# Patient Record
Sex: FEMALE | Race: BLACK OR AFRICAN AMERICAN | NOT HISPANIC OR LATINO | URBAN - METROPOLITAN AREA
[De-identification: names, ages, dates, MRNs, and addresses within clinical notes are randomized per-mention and may not be internally consistent; named-entity substitution may affect disease eponyms.]

---

## 2017-01-10 ENCOUNTER — OUTPATIENT (OUTPATIENT)
Dept: OUTPATIENT SERVICES | Facility: HOSPITAL | Age: 57
LOS: 1 days | Discharge: HOME | End: 2017-01-10

## 2017-01-10 DIAGNOSIS — Z98.89 OTHER SPECIFIED POSTPROCEDURAL STATES: Chronic | ICD-10-CM

## 2017-01-10 DIAGNOSIS — C18.9 MALIGNANT NEOPLASM OF COLON, UNSPECIFIED: ICD-10-CM

## 2017-01-11 ENCOUNTER — FORM ENCOUNTER (OUTPATIENT)
Age: 57
End: 2017-01-11

## 2017-01-11 ENCOUNTER — RESULT REVIEW (OUTPATIENT)
Age: 57
End: 2017-01-11

## 2017-01-12 ENCOUNTER — APPOINTMENT (OUTPATIENT)
Dept: HEMATOLOGY ONCOLOGY | Facility: CLINIC | Age: 57
End: 2017-01-12

## 2017-01-12 ENCOUNTER — OUTPATIENT (OUTPATIENT)
Dept: OUTPATIENT SERVICES | Facility: HOSPITAL | Age: 57
LOS: 1 days | End: 2017-01-12
Payer: COMMERCIAL

## 2017-01-12 ENCOUNTER — APPOINTMENT (OUTPATIENT)
Dept: CT IMAGING | Facility: IMAGING CENTER | Age: 57
End: 2017-01-12

## 2017-01-12 ENCOUNTER — APPOINTMENT (OUTPATIENT)
Dept: INFUSION THERAPY | Facility: HOSPITAL | Age: 57
End: 2017-01-12

## 2017-01-12 DIAGNOSIS — C18.9 MALIGNANT NEOPLASM OF COLON, UNSPECIFIED: ICD-10-CM

## 2017-01-12 DIAGNOSIS — Z98.89 OTHER SPECIFIED POSTPROCEDURAL STATES: Chronic | ICD-10-CM

## 2017-01-12 LAB
BASOPHILS # BLD AUTO: 0.1 K/UL — SIGNIFICANT CHANGE UP (ref 0–0.2)
BASOPHILS NFR BLD AUTO: 1.5 % — SIGNIFICANT CHANGE UP (ref 0–2)
EOSINOPHIL # BLD AUTO: 0.2 K/UL — SIGNIFICANT CHANGE UP (ref 0–0.5)
EOSINOPHIL NFR BLD AUTO: 7 % — HIGH (ref 0–6)
HCT VFR BLD CALC: 26.6 % — LOW (ref 34.5–45)
HGB BLD-MCNC: 8.4 G/DL — LOW (ref 11.5–15.5)
LYMPHOCYTES # BLD AUTO: 1.2 K/UL — SIGNIFICANT CHANGE UP (ref 1–3.3)
LYMPHOCYTES # BLD AUTO: 34.1 % — SIGNIFICANT CHANGE UP (ref 13–44)
MCHC RBC-ENTMCNC: 20.8 PG — LOW (ref 27–34)
MCHC RBC-ENTMCNC: 31.6 GM/DL — LOW (ref 32–36)
MCV RBC AUTO: 66 FL — LOW (ref 80–100)
MONOCYTES # BLD AUTO: 0.4 K/UL — SIGNIFICANT CHANGE UP (ref 0–0.9)
MONOCYTES NFR BLD AUTO: 12 % — SIGNIFICANT CHANGE UP (ref 2–14)
NEUTROPHILS # BLD AUTO: 1.5 K/UL — LOW (ref 1.8–7.4)
NEUTROPHILS NFR BLD AUTO: 45.5 % — SIGNIFICANT CHANGE UP (ref 43–77)
PLATELET # BLD AUTO: 282 K/UL — SIGNIFICANT CHANGE UP (ref 150–400)
RBC # BLD: 4.03 M/UL — SIGNIFICANT CHANGE UP (ref 3.8–5.2)
RBC # FLD: 21.7 % — HIGH (ref 10.3–14.5)
WBC # BLD: 3.4 K/UL — LOW (ref 3.8–10.5)
WBC # FLD AUTO: 3.4 K/UL — LOW (ref 3.8–10.5)

## 2017-01-12 PROCEDURE — 74177 CT ABD & PELVIS W/CONTRAST: CPT

## 2017-01-12 PROCEDURE — 71260 CT THORAX DX C+: CPT

## 2017-01-23 ENCOUNTER — APPOINTMENT (OUTPATIENT)
Dept: INFUSION THERAPY | Facility: HOSPITAL | Age: 57
End: 2017-01-23

## 2017-01-25 ENCOUNTER — FORM ENCOUNTER (OUTPATIENT)
Age: 57
End: 2017-01-25

## 2017-01-25 ENCOUNTER — RESULT REVIEW (OUTPATIENT)
Age: 57
End: 2017-01-25

## 2017-01-26 ENCOUNTER — APPOINTMENT (OUTPATIENT)
Dept: INFUSION THERAPY | Facility: HOSPITAL | Age: 57
End: 2017-01-26

## 2017-01-26 ENCOUNTER — OUTPATIENT (OUTPATIENT)
Dept: OUTPATIENT SERVICES | Facility: HOSPITAL | Age: 57
LOS: 1 days | End: 2017-01-26
Payer: COMMERCIAL

## 2017-01-26 ENCOUNTER — APPOINTMENT (OUTPATIENT)
Dept: HEMATOLOGY ONCOLOGY | Facility: CLINIC | Age: 57
End: 2017-01-26

## 2017-01-26 ENCOUNTER — APPOINTMENT (OUTPATIENT)
Dept: RADIOLOGY | Facility: IMAGING CENTER | Age: 57
End: 2017-01-26

## 2017-01-26 ENCOUNTER — APPOINTMENT (OUTPATIENT)
Dept: ULTRASOUND IMAGING | Facility: IMAGING CENTER | Age: 57
End: 2017-01-26

## 2017-01-26 ENCOUNTER — RESULT REVIEW (OUTPATIENT)
Age: 57
End: 2017-01-26

## 2017-01-26 VITALS
HEART RATE: 80 BPM | OXYGEN SATURATION: 99 % | TEMPERATURE: 97.5 F | RESPIRATION RATE: 16 BRPM | WEIGHT: 169.76 LBS | DIASTOLIC BLOOD PRESSURE: 100 MMHG | SYSTOLIC BLOOD PRESSURE: 140 MMHG | BODY MASS INDEX: 32.05 KG/M2

## 2017-01-26 DIAGNOSIS — C18.9 MALIGNANT NEOPLASM OF COLON, UNSPECIFIED: ICD-10-CM

## 2017-01-26 DIAGNOSIS — Z98.89 OTHER SPECIFIED POSTPROCEDURAL STATES: Chronic | ICD-10-CM

## 2017-01-26 LAB
BASOPHILS # BLD AUTO: 0 K/UL — SIGNIFICANT CHANGE UP (ref 0–0.2)
BASOPHILS NFR BLD AUTO: 0.5 % — SIGNIFICANT CHANGE UP (ref 0–2)
EOSINOPHIL # BLD AUTO: 0.2 K/UL — SIGNIFICANT CHANGE UP (ref 0–0.5)
EOSINOPHIL NFR BLD AUTO: 3.7 % — SIGNIFICANT CHANGE UP (ref 0–6)
HCT VFR BLD CALC: 27.9 % — LOW (ref 34.5–45)
HGB BLD-MCNC: 8.6 G/DL — LOW (ref 11.5–15.5)
LYMPHOCYTES # BLD AUTO: 1.4 K/UL — SIGNIFICANT CHANGE UP (ref 1–3.3)
LYMPHOCYTES # BLD AUTO: 28.7 % — SIGNIFICANT CHANGE UP (ref 13–44)
MCHC RBC-ENTMCNC: 20.2 PG — LOW (ref 27–34)
MCHC RBC-ENTMCNC: 30.9 GM/DL — LOW (ref 32–36)
MCV RBC AUTO: 65.3 FL — LOW (ref 80–100)
MONOCYTES # BLD AUTO: 0.5 K/UL — SIGNIFICANT CHANGE UP (ref 0–0.9)
MONOCYTES NFR BLD AUTO: 9.8 % — SIGNIFICANT CHANGE UP (ref 2–14)
NEUTROPHILS # BLD AUTO: 2.9 K/UL — SIGNIFICANT CHANGE UP (ref 1.8–7.4)
NEUTROPHILS NFR BLD AUTO: 57.3 % — SIGNIFICANT CHANGE UP (ref 43–77)
PLATELET # BLD AUTO: 378 K/UL — SIGNIFICANT CHANGE UP (ref 150–400)
RBC # BLD: 4.27 M/UL — SIGNIFICANT CHANGE UP (ref 3.8–5.2)
RBC # FLD: 20.7 % — HIGH (ref 10.3–14.5)
WBC # BLD: 5.1 K/UL — SIGNIFICANT CHANGE UP (ref 3.8–10.5)
WBC # FLD AUTO: 5.1 K/UL — SIGNIFICANT CHANGE UP (ref 3.8–10.5)

## 2017-01-26 PROCEDURE — 72100 X-RAY EXAM L-S SPINE 2/3 VWS: CPT | Mod: 26

## 2017-01-26 PROCEDURE — 93971 EXTREMITY STUDY: CPT

## 2017-01-26 PROCEDURE — 93971 EXTREMITY STUDY: CPT | Mod: 26,RT

## 2017-01-26 PROCEDURE — 72100 X-RAY EXAM L-S SPINE 2/3 VWS: CPT

## 2017-02-02 ENCOUNTER — APPOINTMENT (OUTPATIENT)
Dept: INFUSION THERAPY | Facility: HOSPITAL | Age: 57
End: 2017-02-02

## 2017-02-10 ENCOUNTER — OUTPATIENT (OUTPATIENT)
Dept: OUTPATIENT SERVICES | Facility: HOSPITAL | Age: 57
LOS: 1 days | Discharge: HOME | End: 2017-02-10

## 2017-02-10 ENCOUNTER — RESULT REVIEW (OUTPATIENT)
Age: 57
End: 2017-02-10

## 2017-02-10 DIAGNOSIS — Z98.89 OTHER SPECIFIED POSTPROCEDURAL STATES: Chronic | ICD-10-CM

## 2017-02-10 DIAGNOSIS — C18.9 MALIGNANT NEOPLASM OF COLON, UNSPECIFIED: ICD-10-CM

## 2017-02-11 ENCOUNTER — APPOINTMENT (OUTPATIENT)
Dept: INFUSION THERAPY | Facility: HOSPITAL | Age: 57
End: 2017-02-11

## 2017-02-11 LAB
BASOPHILS # BLD AUTO: 0 K/UL — SIGNIFICANT CHANGE UP (ref 0–0.2)
BASOPHILS NFR BLD AUTO: 0.6 % — SIGNIFICANT CHANGE UP (ref 0–2)
EOSINOPHIL # BLD AUTO: 0.6 K/UL — HIGH (ref 0–0.5)
EOSINOPHIL NFR BLD AUTO: 9.9 % — HIGH (ref 0–6)
HCT VFR BLD CALC: 29 % — LOW (ref 34.5–45)
HGB BLD-MCNC: 9 G/DL — LOW (ref 11.5–15.5)
LYMPHOCYTES # BLD AUTO: 1.6 K/UL — SIGNIFICANT CHANGE UP (ref 1–3.3)
LYMPHOCYTES # BLD AUTO: 27.6 % — SIGNIFICANT CHANGE UP (ref 13–44)
MCHC RBC-ENTMCNC: 20.9 PG — LOW (ref 27–34)
MCHC RBC-ENTMCNC: 31.1 G/DL — LOW (ref 32–36)
MCV RBC AUTO: 67 FL — LOW (ref 80–100)
MONOCYTES # BLD AUTO: 0.4 K/UL — SIGNIFICANT CHANGE UP (ref 0–0.9)
MONOCYTES NFR BLD AUTO: 7.7 % — SIGNIFICANT CHANGE UP (ref 2–14)
NEUTROPHILS # BLD AUTO: 3.1 K/UL — SIGNIFICANT CHANGE UP (ref 1.8–7.4)
NEUTROPHILS NFR BLD AUTO: 54.2 % — SIGNIFICANT CHANGE UP (ref 43–77)
PLATELET # BLD AUTO: 444 K/UL — HIGH (ref 150–400)
RBC # BLD: 4.32 M/UL — SIGNIFICANT CHANGE UP (ref 3.8–5.2)
RBC # FLD: 20.3 % — HIGH (ref 10.3–14.5)
WBC # BLD: 5.8 K/UL — SIGNIFICANT CHANGE UP (ref 3.8–10.5)
WBC # FLD AUTO: 5.8 K/UL — SIGNIFICANT CHANGE UP (ref 3.8–10.5)

## 2017-02-14 DIAGNOSIS — Z51.11 ENCOUNTER FOR ANTINEOPLASTIC CHEMOTHERAPY: ICD-10-CM

## 2017-02-14 DIAGNOSIS — R11.2 NAUSEA WITH VOMITING, UNSPECIFIED: ICD-10-CM

## 2017-03-02 ENCOUNTER — APPOINTMENT (OUTPATIENT)
Dept: INFUSION THERAPY | Facility: HOSPITAL | Age: 57
End: 2017-03-02

## 2017-03-07 ENCOUNTER — OUTPATIENT (OUTPATIENT)
Dept: OUTPATIENT SERVICES | Facility: HOSPITAL | Age: 57
LOS: 1 days | Discharge: HOME | End: 2017-03-07

## 2017-03-07 DIAGNOSIS — C18.9 MALIGNANT NEOPLASM OF COLON, UNSPECIFIED: ICD-10-CM

## 2017-03-07 DIAGNOSIS — Z98.89 OTHER SPECIFIED POSTPROCEDURAL STATES: Chronic | ICD-10-CM

## 2017-03-08 ENCOUNTER — RESULT REVIEW (OUTPATIENT)
Age: 57
End: 2017-03-08

## 2017-03-09 ENCOUNTER — LABORATORY RESULT (OUTPATIENT)
Age: 57
End: 2017-03-09

## 2017-03-09 ENCOUNTER — APPOINTMENT (OUTPATIENT)
Dept: HEMATOLOGY ONCOLOGY | Facility: CLINIC | Age: 57
End: 2017-03-09

## 2017-03-09 ENCOUNTER — APPOINTMENT (OUTPATIENT)
Dept: INFUSION THERAPY | Facility: HOSPITAL | Age: 57
End: 2017-03-09

## 2017-03-09 VITALS
WEIGHT: 173.06 LBS | BODY MASS INDEX: 32.67 KG/M2 | SYSTOLIC BLOOD PRESSURE: 160 MMHG | HEART RATE: 80 BPM | TEMPERATURE: 97.6 F | DIASTOLIC BLOOD PRESSURE: 100 MMHG | RESPIRATION RATE: 16 BRPM

## 2017-03-09 DIAGNOSIS — R05 COUGH: ICD-10-CM

## 2017-03-09 LAB
BASOPHILS # BLD AUTO: 0 K/UL — SIGNIFICANT CHANGE UP (ref 0–0.2)
BASOPHILS NFR BLD AUTO: 0.5 % — SIGNIFICANT CHANGE UP (ref 0–2)
EOSINOPHIL # BLD AUTO: 0.4 K/UL — SIGNIFICANT CHANGE UP (ref 0–0.5)
EOSINOPHIL NFR BLD AUTO: 5.8 % — SIGNIFICANT CHANGE UP (ref 0–6)
HCT VFR BLD CALC: 28.9 % — LOW (ref 34.5–45)
HGB BLD-MCNC: 9.1 G/DL — LOW (ref 11.5–15.5)
LYMPHOCYTES # BLD AUTO: 1.8 K/UL — SIGNIFICANT CHANGE UP (ref 1–3.3)
LYMPHOCYTES # BLD AUTO: 30.2 % — SIGNIFICANT CHANGE UP (ref 13–44)
MCHC RBC-ENTMCNC: 20.3 PG — LOW (ref 27–34)
MCHC RBC-ENTMCNC: 31.5 G/DL — LOW (ref 32–36)
MCV RBC AUTO: 64.6 FL — LOW (ref 80–100)
MONOCYTES # BLD AUTO: 0.5 K/UL — SIGNIFICANT CHANGE UP (ref 0–0.9)
MONOCYTES NFR BLD AUTO: 8.8 % — SIGNIFICANT CHANGE UP (ref 2–14)
NEUTROPHILS # BLD AUTO: 3.4 K/UL — SIGNIFICANT CHANGE UP (ref 1.8–7.4)
NEUTROPHILS NFR BLD AUTO: 54.7 % — SIGNIFICANT CHANGE UP (ref 43–77)
PLATELET # BLD AUTO: 410 K/UL — HIGH (ref 150–400)
RBC # BLD: 4.48 M/UL — SIGNIFICANT CHANGE UP (ref 3.8–5.2)
RBC # FLD: 20.3 % — HIGH (ref 10.3–14.5)
WBC # BLD: 6.1 K/UL — SIGNIFICANT CHANGE UP (ref 3.8–10.5)
WBC # FLD AUTO: 6.1 K/UL — SIGNIFICANT CHANGE UP (ref 3.8–10.5)

## 2017-03-10 ENCOUNTER — APPOINTMENT (OUTPATIENT)
Dept: HEMATOLOGY ONCOLOGY | Facility: CLINIC | Age: 57
End: 2017-03-10

## 2017-03-10 DIAGNOSIS — Z51.11 ENCOUNTER FOR ANTINEOPLASTIC CHEMOTHERAPY: ICD-10-CM

## 2017-03-10 DIAGNOSIS — R11.2 NAUSEA WITH VOMITING, UNSPECIFIED: ICD-10-CM

## 2017-03-29 ENCOUNTER — RESULT REVIEW (OUTPATIENT)
Age: 57
End: 2017-03-29

## 2017-03-29 ENCOUNTER — FORM ENCOUNTER (OUTPATIENT)
Age: 57
End: 2017-03-29

## 2017-03-30 ENCOUNTER — LABORATORY RESULT (OUTPATIENT)
Age: 57
End: 2017-03-30

## 2017-03-30 ENCOUNTER — APPOINTMENT (OUTPATIENT)
Dept: RADIOLOGY | Facility: IMAGING CENTER | Age: 57
End: 2017-03-30

## 2017-03-30 ENCOUNTER — APPOINTMENT (OUTPATIENT)
Dept: INFUSION THERAPY | Facility: HOSPITAL | Age: 57
End: 2017-03-30

## 2017-03-30 ENCOUNTER — OUTPATIENT (OUTPATIENT)
Dept: OUTPATIENT SERVICES | Facility: HOSPITAL | Age: 57
LOS: 1 days | End: 2017-03-30
Payer: COMMERCIAL

## 2017-03-30 DIAGNOSIS — C18.9 MALIGNANT NEOPLASM OF COLON, UNSPECIFIED: ICD-10-CM

## 2017-03-30 DIAGNOSIS — Z98.89 OTHER SPECIFIED POSTPROCEDURAL STATES: Chronic | ICD-10-CM

## 2017-03-30 LAB
BASOPHILS # BLD AUTO: 0 K/UL — SIGNIFICANT CHANGE UP (ref 0–0.2)
BASOPHILS NFR BLD AUTO: 0.9 % — SIGNIFICANT CHANGE UP (ref 0–2)
EOSINOPHIL # BLD AUTO: 0.4 K/UL — SIGNIFICANT CHANGE UP (ref 0–0.5)
EOSINOPHIL NFR BLD AUTO: 9.9 % — HIGH (ref 0–6)
HCT VFR BLD CALC: 28.3 % — LOW (ref 34.5–45)
HGB BLD-MCNC: 8.7 G/DL — LOW (ref 11.5–15.5)
LYMPHOCYTES # BLD AUTO: 1.4 K/UL — SIGNIFICANT CHANGE UP (ref 1–3.3)
LYMPHOCYTES # BLD AUTO: 34.8 % — SIGNIFICANT CHANGE UP (ref 13–44)
MCHC RBC-ENTMCNC: 20.3 PG — LOW (ref 27–34)
MCHC RBC-ENTMCNC: 30.9 G/DL — LOW (ref 32–36)
MCV RBC AUTO: 65.7 FL — LOW (ref 80–100)
MONOCYTES # BLD AUTO: 0.6 K/UL — SIGNIFICANT CHANGE UP (ref 0–0.9)
MONOCYTES NFR BLD AUTO: 15.1 % — HIGH (ref 2–14)
NEUTROPHILS # BLD AUTO: 1.6 K/UL — LOW (ref 1.8–7.4)
NEUTROPHILS NFR BLD AUTO: 39.4 % — LOW (ref 43–77)
PLATELET # BLD AUTO: 485 K/UL — HIGH (ref 150–400)
RBC # BLD: 4.3 M/UL — SIGNIFICANT CHANGE UP (ref 3.8–5.2)
RBC # FLD: 21.2 % — HIGH (ref 10.3–14.5)
RETICS #: 79.2 K/UL — SIGNIFICANT CHANGE UP (ref 25–125)
RETICS/RBC NFR: 1.8 % — SIGNIFICANT CHANGE UP (ref 0.5–2.5)
WBC # BLD: 4 K/UL — SIGNIFICANT CHANGE UP (ref 3.8–10.5)
WBC # FLD AUTO: 4 K/UL — SIGNIFICANT CHANGE UP (ref 3.8–10.5)

## 2017-03-30 PROCEDURE — 71046 X-RAY EXAM CHEST 2 VIEWS: CPT

## 2017-03-30 PROCEDURE — 71020: CPT | Mod: 26

## 2017-04-17 ENCOUNTER — OUTPATIENT (OUTPATIENT)
Dept: OUTPATIENT SERVICES | Facility: HOSPITAL | Age: 57
LOS: 1 days | Discharge: HOME | End: 2017-04-17
Payer: COMMERCIAL

## 2017-04-17 DIAGNOSIS — C18.9 MALIGNANT NEOPLASM OF COLON, UNSPECIFIED: ICD-10-CM

## 2017-04-17 DIAGNOSIS — Z98.89 OTHER SPECIFIED POSTPROCEDURAL STATES: Chronic | ICD-10-CM

## 2017-04-19 ENCOUNTER — RESULT REVIEW (OUTPATIENT)
Age: 57
End: 2017-04-19

## 2017-04-19 ENCOUNTER — APPOINTMENT (OUTPATIENT)
Dept: INFUSION THERAPY | Facility: HOSPITAL | Age: 57
End: 2017-04-19

## 2017-04-20 ENCOUNTER — LABORATORY RESULT (OUTPATIENT)
Age: 57
End: 2017-04-20

## 2017-04-20 ENCOUNTER — APPOINTMENT (OUTPATIENT)
Dept: INFUSION THERAPY | Facility: HOSPITAL | Age: 57
End: 2017-04-20

## 2017-04-20 LAB
BASOPHILS # BLD AUTO: 0 K/UL — SIGNIFICANT CHANGE UP (ref 0–0.2)
BASOPHILS NFR BLD AUTO: 0.6 % — SIGNIFICANT CHANGE UP (ref 0–2)
EOSINOPHIL # BLD AUTO: 0.3 K/UL — SIGNIFICANT CHANGE UP (ref 0–0.5)
EOSINOPHIL NFR BLD AUTO: 9 % — HIGH (ref 0–6)
HCT VFR BLD CALC: 30.5 % — LOW (ref 34.5–45)
HGB BLD-MCNC: 9.7 G/DL — LOW (ref 11.5–15.5)
LYMPHOCYTES # BLD AUTO: 1.4 K/UL — SIGNIFICANT CHANGE UP (ref 1–3.3)
LYMPHOCYTES # BLD AUTO: 37.2 % — SIGNIFICANT CHANGE UP (ref 13–44)
MCHC RBC-ENTMCNC: 21.6 PG — LOW (ref 27–34)
MCHC RBC-ENTMCNC: 31.8 G/DL — LOW (ref 32–36)
MCV RBC AUTO: 67.9 FL — LOW (ref 80–100)
MONOCYTES # BLD AUTO: 0.5 K/UL — SIGNIFICANT CHANGE UP (ref 0–0.9)
MONOCYTES NFR BLD AUTO: 13.8 % — SIGNIFICANT CHANGE UP (ref 2–14)
NEUTROPHILS # BLD AUTO: 1.5 K/UL — LOW (ref 1.8–7.4)
NEUTROPHILS NFR BLD AUTO: 39.4 % — LOW (ref 43–77)
PLATELET # BLD AUTO: 484 K/UL — HIGH (ref 150–400)
RBC # BLD: 4.5 M/UL — SIGNIFICANT CHANGE UP (ref 3.8–5.2)
RBC # FLD: 23.6 % — HIGH (ref 10.3–14.5)
WBC # BLD: 3.8 K/UL — SIGNIFICANT CHANGE UP (ref 3.8–10.5)
WBC # FLD AUTO: 3.8 K/UL — SIGNIFICANT CHANGE UP (ref 3.8–10.5)

## 2017-04-21 ENCOUNTER — FORM ENCOUNTER (OUTPATIENT)
Age: 57
End: 2017-04-21

## 2017-04-22 ENCOUNTER — APPOINTMENT (OUTPATIENT)
Dept: CT IMAGING | Facility: CLINIC | Age: 57
End: 2017-04-22

## 2017-04-22 ENCOUNTER — OUTPATIENT (OUTPATIENT)
Dept: OUTPATIENT SERVICES | Facility: HOSPITAL | Age: 57
LOS: 1 days | End: 2017-04-22
Payer: COMMERCIAL

## 2017-04-22 DIAGNOSIS — C18.9 MALIGNANT NEOPLASM OF COLON, UNSPECIFIED: ICD-10-CM

## 2017-04-22 DIAGNOSIS — Z98.89 OTHER SPECIFIED POSTPROCEDURAL STATES: Chronic | ICD-10-CM

## 2017-04-22 PROCEDURE — 71260 CT THORAX DX C+: CPT

## 2017-04-22 PROCEDURE — 74177 CT ABD & PELVIS W/CONTRAST: CPT

## 2017-05-01 ENCOUNTER — APPOINTMENT (OUTPATIENT)
Dept: HEMATOLOGY ONCOLOGY | Facility: CLINIC | Age: 57
End: 2017-05-01

## 2017-05-01 VITALS
DIASTOLIC BLOOD PRESSURE: 100 MMHG | HEART RATE: 80 BPM | SYSTOLIC BLOOD PRESSURE: 172 MMHG | TEMPERATURE: 97 F | OXYGEN SATURATION: 98 % | RESPIRATION RATE: 16 BRPM | WEIGHT: 174.17 LBS | BODY MASS INDEX: 32.88 KG/M2

## 2017-05-01 DIAGNOSIS — C18.9 MALIGNANT NEOPLASM OF COLON, UNSPECIFIED: ICD-10-CM

## 2017-05-01 DIAGNOSIS — C78.00 SECONDARY MALIGNANT NEOPLASM OF UNSPECIFIED LUNG: ICD-10-CM

## 2017-05-01 DIAGNOSIS — C78.7 SECONDARY MALIGNANT NEOPLASM OF LIVER AND INTRAHEPATIC BILE DUCT: ICD-10-CM

## 2017-05-01 RX ORDER — ALBUTEROL SULFATE 90 UG/1
108 (90 BASE) AEROSOL, METERED RESPIRATORY (INHALATION)
Qty: 1 | Refills: 0 | Status: ACTIVE | COMMUNITY
Start: 2017-05-01 | End: 1900-01-01

## 2017-05-05 ENCOUNTER — APPOINTMENT (OUTPATIENT)
Dept: HEMATOLOGY ONCOLOGY | Facility: CLINIC | Age: 57
End: 2017-05-05

## 2017-05-05 ENCOUNTER — RESULT REVIEW (OUTPATIENT)
Age: 57
End: 2017-05-05

## 2017-05-05 LAB
BASOPHILS # BLD AUTO: 0.1 K/UL — SIGNIFICANT CHANGE UP (ref 0–0.2)
BASOPHILS NFR BLD AUTO: 1.4 % — SIGNIFICANT CHANGE UP (ref 0–2)
EOSINOPHIL # BLD AUTO: 0.5 K/UL — SIGNIFICANT CHANGE UP (ref 0–0.5)
EOSINOPHIL NFR BLD AUTO: 9.4 % — HIGH (ref 0–6)
HCT VFR BLD CALC: 33.9 % — LOW (ref 34.5–45)
HGB BLD-MCNC: 10.6 G/DL — LOW (ref 11.5–15.5)
LYMPHOCYTES # BLD AUTO: 1.6 K/UL — SIGNIFICANT CHANGE UP (ref 1–3.3)
LYMPHOCYTES # BLD AUTO: 32.1 % — SIGNIFICANT CHANGE UP (ref 13–44)
MCHC RBC-ENTMCNC: 21.8 PG — LOW (ref 27–34)
MCHC RBC-ENTMCNC: 31.3 G/DL — LOW (ref 32–36)
MCV RBC AUTO: 69.5 FL — LOW (ref 80–100)
MONOCYTES # BLD AUTO: 0.3 K/UL — SIGNIFICANT CHANGE UP (ref 0–0.9)
MONOCYTES NFR BLD AUTO: 6.9 % — SIGNIFICANT CHANGE UP (ref 2–14)
NEUTROPHILS # BLD AUTO: 2.6 K/UL — SIGNIFICANT CHANGE UP (ref 1.8–7.4)
NEUTROPHILS NFR BLD AUTO: 50.3 % — SIGNIFICANT CHANGE UP (ref 43–77)
PLATELET # BLD AUTO: 279 K/UL — SIGNIFICANT CHANGE UP (ref 150–400)
RBC # BLD: 4.88 M/UL — SIGNIFICANT CHANGE UP (ref 3.8–5.2)
RBC # FLD: 22.8 % — HIGH (ref 10.3–14.5)
WBC # BLD: 5.1 K/UL — SIGNIFICANT CHANGE UP (ref 3.8–10.5)
WBC # FLD AUTO: 5.1 K/UL — SIGNIFICANT CHANGE UP (ref 3.8–10.5)

## 2017-05-08 ENCOUNTER — RESULT REVIEW (OUTPATIENT)
Age: 57
End: 2017-05-08

## 2017-05-08 ENCOUNTER — APPOINTMENT (OUTPATIENT)
Dept: INFUSION THERAPY | Facility: HOSPITAL | Age: 57
End: 2017-05-08

## 2017-05-08 ENCOUNTER — OTHER (OUTPATIENT)
Age: 57
End: 2017-05-08

## 2017-05-08 LAB
BASOPHILS # BLD AUTO: 0 K/UL — SIGNIFICANT CHANGE UP (ref 0–0.2)
BASOPHILS NFR BLD AUTO: 0.6 % — SIGNIFICANT CHANGE UP (ref 0–2)
EOSINOPHIL # BLD AUTO: 0.6 K/UL — HIGH (ref 0–0.5)
EOSINOPHIL NFR BLD AUTO: 11.6 % — HIGH (ref 0–6)
HCT VFR BLD CALC: 34.8 % — SIGNIFICANT CHANGE UP (ref 34.5–45)
HGB BLD-MCNC: 11 G/DL — LOW (ref 11.5–15.5)
LYMPHOCYTES # BLD AUTO: 1.5 K/UL — SIGNIFICANT CHANGE UP (ref 1–3.3)
LYMPHOCYTES # BLD AUTO: 30 % — SIGNIFICANT CHANGE UP (ref 13–44)
MCHC RBC-ENTMCNC: 22.1 PG — LOW (ref 27–34)
MCHC RBC-ENTMCNC: 31.5 G/DL — LOW (ref 32–36)
MCV RBC AUTO: 70 FL — LOW (ref 80–100)
MONOCYTES # BLD AUTO: 0.3 K/UL — SIGNIFICANT CHANGE UP (ref 0–0.9)
MONOCYTES NFR BLD AUTO: 6.9 % — SIGNIFICANT CHANGE UP (ref 2–14)
NEUTROPHILS # BLD AUTO: 2.6 K/UL — SIGNIFICANT CHANGE UP (ref 1.8–7.4)
NEUTROPHILS NFR BLD AUTO: 50.9 % — SIGNIFICANT CHANGE UP (ref 43–77)
PLATELET # BLD AUTO: 292 K/UL — SIGNIFICANT CHANGE UP (ref 150–400)
RBC # BLD: 4.96 M/UL — SIGNIFICANT CHANGE UP (ref 3.8–5.2)
RBC # FLD: 22.8 % — HIGH (ref 10.3–14.5)
WBC # BLD: 5 K/UL — SIGNIFICANT CHANGE UP (ref 3.8–10.5)
WBC # FLD AUTO: 5 K/UL — SIGNIFICANT CHANGE UP (ref 3.8–10.5)

## 2017-05-08 PROCEDURE — 93010 ELECTROCARDIOGRAM REPORT: CPT

## 2017-05-08 RX ORDER — AMLODIPINE BESYLATE 5 MG/1
5 TABLET ORAL DAILY
Qty: 30 | Refills: 0 | Status: ACTIVE | COMMUNITY
Start: 2017-05-08 | End: 1900-01-01

## 2017-05-09 DIAGNOSIS — Z51.11 ENCOUNTER FOR ANTINEOPLASTIC CHEMOTHERAPY: ICD-10-CM

## 2017-05-09 DIAGNOSIS — R11.2 NAUSEA WITH VOMITING, UNSPECIFIED: ICD-10-CM

## 2017-05-12 ENCOUNTER — APPOINTMENT (OUTPATIENT)
Dept: HEMATOLOGY ONCOLOGY | Facility: CLINIC | Age: 57
End: 2017-05-12

## 2017-05-12 DIAGNOSIS — F41.9 ANXIETY DISORDER, UNSPECIFIED: ICD-10-CM

## 2017-05-16 ENCOUNTER — APPOINTMENT (OUTPATIENT)
Dept: HEMATOLOGY ONCOLOGY | Facility: CLINIC | Age: 57
End: 2017-05-16

## 2017-05-22 ENCOUNTER — APPOINTMENT (OUTPATIENT)
Dept: INFUSION THERAPY | Facility: HOSPITAL | Age: 57
End: 2017-05-22

## 2017-06-05 ENCOUNTER — APPOINTMENT (OUTPATIENT)
Dept: INFUSION THERAPY | Facility: HOSPITAL | Age: 57
End: 2017-06-05

## 2017-06-13 ENCOUNTER — OUTPATIENT (OUTPATIENT)
Dept: OUTPATIENT SERVICES | Facility: HOSPITAL | Age: 57
LOS: 1 days | Discharge: HOME | End: 2017-06-13

## 2017-06-13 ENCOUNTER — APPOINTMENT (OUTPATIENT)
Dept: HEMATOLOGY ONCOLOGY | Facility: CLINIC | Age: 57
End: 2017-06-13

## 2017-06-13 DIAGNOSIS — Z98.89 OTHER SPECIFIED POSTPROCEDURAL STATES: Chronic | ICD-10-CM

## 2017-06-13 DIAGNOSIS — C18.9 MALIGNANT NEOPLASM OF COLON, UNSPECIFIED: ICD-10-CM

## 2017-06-19 ENCOUNTER — APPOINTMENT (OUTPATIENT)
Dept: INFUSION THERAPY | Facility: HOSPITAL | Age: 57
End: 2017-06-19

## 2017-07-03 ENCOUNTER — APPOINTMENT (OUTPATIENT)
Dept: INFUSION THERAPY | Facility: HOSPITAL | Age: 57
End: 2017-07-03

## 2017-07-11 ENCOUNTER — APPOINTMENT (OUTPATIENT)
Dept: HEMATOLOGY ONCOLOGY | Facility: CLINIC | Age: 57
End: 2017-07-11

## 2017-07-17 ENCOUNTER — APPOINTMENT (OUTPATIENT)
Dept: INFUSION THERAPY | Facility: HOSPITAL | Age: 57
End: 2017-07-17

## 2017-07-31 ENCOUNTER — APPOINTMENT (OUTPATIENT)
Dept: INFUSION THERAPY | Facility: HOSPITAL | Age: 57
End: 2017-07-31

## 2017-08-08 ENCOUNTER — APPOINTMENT (OUTPATIENT)
Dept: HEMATOLOGY ONCOLOGY | Facility: CLINIC | Age: 57
End: 2017-08-08

## 2017-09-25 ENCOUNTER — TRANSCRIPTION ENCOUNTER (OUTPATIENT)
Age: 57
End: 2017-09-25

## 2017-09-25 ENCOUNTER — INPATIENT (INPATIENT)
Facility: HOSPITAL | Age: 57
LOS: 0 days | Discharge: HOME | End: 2017-09-26
Attending: HOSPITALIST | Admitting: HOSPITALIST
Payer: COMMERCIAL

## 2017-09-25 VITALS
SYSTOLIC BLOOD PRESSURE: 134 MMHG | TEMPERATURE: 99 F | OXYGEN SATURATION: 97 % | HEART RATE: 125 BPM | RESPIRATION RATE: 20 BRPM | DIASTOLIC BLOOD PRESSURE: 95 MMHG

## 2017-09-25 DIAGNOSIS — R50.9 FEVER, UNSPECIFIED: ICD-10-CM

## 2017-09-25 DIAGNOSIS — R53.81 OTHER MALAISE: ICD-10-CM

## 2017-09-25 DIAGNOSIS — Z98.89 OTHER SPECIFIED POSTPROCEDURAL STATES: Chronic | ICD-10-CM

## 2017-09-25 DIAGNOSIS — Z29.9 ENCOUNTER FOR PROPHYLACTIC MEASURES, UNSPECIFIED: ICD-10-CM

## 2017-09-25 DIAGNOSIS — C78.5 SECONDARY MALIGNANT NEOPLASM OF LARGE INTESTINE AND RECTUM: ICD-10-CM

## 2017-09-25 DIAGNOSIS — R42 DIZZINESS AND GIDDINESS: ICD-10-CM

## 2017-09-25 DIAGNOSIS — J18.9 PNEUMONIA, UNSPECIFIED ORGANISM: ICD-10-CM

## 2017-09-25 LAB
ALBUMIN SERPL ELPH-MCNC: 3.3 G/DL — SIGNIFICANT CHANGE UP (ref 3.3–5)
ALP SERPL-CCNC: 150 U/L — HIGH (ref 40–120)
ALT FLD-CCNC: 32 U/L — SIGNIFICANT CHANGE UP (ref 4–33)
APPEARANCE UR: CLEAR — SIGNIFICANT CHANGE UP
APTT BLD: 28 SEC — SIGNIFICANT CHANGE UP (ref 27.5–37.4)
AST SERPL-CCNC: 52 U/L — HIGH (ref 4–32)
B PERT DNA SPEC QL NAA+PROBE: SIGNIFICANT CHANGE UP
BASE EXCESS BLDV CALC-SCNC: 8.8 MMOL/L — SIGNIFICANT CHANGE UP
BASOPHILS # BLD AUTO: 0.01 K/UL — SIGNIFICANT CHANGE UP (ref 0–0.2)
BASOPHILS NFR BLD AUTO: 0.2 % — SIGNIFICANT CHANGE UP (ref 0–2)
BILIRUB SERPL-MCNC: 0.5 MG/DL — SIGNIFICANT CHANGE UP (ref 0.2–1.2)
BILIRUB UR-MCNC: NEGATIVE — SIGNIFICANT CHANGE UP
BLOOD GAS VENOUS - CREATININE: 0.61 MG/DL — SIGNIFICANT CHANGE UP (ref 0.5–1.3)
BLOOD UR QL VISUAL: NEGATIVE — SIGNIFICANT CHANGE UP
BUN SERPL-MCNC: 17 MG/DL — SIGNIFICANT CHANGE UP (ref 7–23)
C PNEUM DNA SPEC QL NAA+PROBE: NOT DETECTED — SIGNIFICANT CHANGE UP
CALCIUM SERPL-MCNC: 9.1 MG/DL — SIGNIFICANT CHANGE UP (ref 8.4–10.5)
CHLORIDE BLDV-SCNC: 104 MMOL/L — SIGNIFICANT CHANGE UP (ref 96–108)
CHLORIDE SERPL-SCNC: 103 MMOL/L — SIGNIFICANT CHANGE UP (ref 98–107)
CO2 SERPL-SCNC: 34 MMOL/L — HIGH (ref 22–31)
COLOR SPEC: YELLOW — SIGNIFICANT CHANGE UP
CREAT SERPL-MCNC: 0.77 MG/DL — SIGNIFICANT CHANGE UP (ref 0.5–1.3)
EOSINOPHIL # BLD AUTO: 0.11 K/UL — SIGNIFICANT CHANGE UP (ref 0–0.5)
EOSINOPHIL NFR BLD AUTO: 1.9 % — SIGNIFICANT CHANGE UP (ref 0–6)
FLUAV H1 2009 PAND RNA SPEC QL NAA+PROBE: NOT DETECTED — SIGNIFICANT CHANGE UP
FLUAV H1 RNA SPEC QL NAA+PROBE: NOT DETECTED — SIGNIFICANT CHANGE UP
FLUAV H3 RNA SPEC QL NAA+PROBE: NOT DETECTED — SIGNIFICANT CHANGE UP
FLUAV SUBTYP SPEC NAA+PROBE: SIGNIFICANT CHANGE UP
FLUBV RNA SPEC QL NAA+PROBE: NOT DETECTED — SIGNIFICANT CHANGE UP
GAS PNL BLDV: 142 MMOL/L — SIGNIFICANT CHANGE UP (ref 136–146)
GLUCOSE BLDV-MCNC: 113 — HIGH (ref 70–99)
GLUCOSE SERPL-MCNC: 121 MG/DL — HIGH (ref 70–99)
GLUCOSE UR-MCNC: NEGATIVE — SIGNIFICANT CHANGE UP
HADV DNA SPEC QL NAA+PROBE: NOT DETECTED — SIGNIFICANT CHANGE UP
HCO3 BLDV-SCNC: 30 MMOL/L — HIGH (ref 20–27)
HCOV 229E RNA SPEC QL NAA+PROBE: NOT DETECTED — SIGNIFICANT CHANGE UP
HCOV HKU1 RNA SPEC QL NAA+PROBE: NOT DETECTED — SIGNIFICANT CHANGE UP
HCOV NL63 RNA SPEC QL NAA+PROBE: NOT DETECTED — SIGNIFICANT CHANGE UP
HCOV OC43 RNA SPEC QL NAA+PROBE: NOT DETECTED — SIGNIFICANT CHANGE UP
HCT VFR BLD CALC: 39.1 % — SIGNIFICANT CHANGE UP (ref 34.5–45)
HCT VFR BLDV CALC: 37 % — SIGNIFICANT CHANGE UP (ref 34.5–45)
HGB BLD-MCNC: 11.7 G/DL — SIGNIFICANT CHANGE UP (ref 11.5–15.5)
HGB BLDV-MCNC: 12 G/DL — SIGNIFICANT CHANGE UP (ref 11.5–15.5)
HMPV RNA SPEC QL NAA+PROBE: NOT DETECTED — SIGNIFICANT CHANGE UP
HPIV1 RNA SPEC QL NAA+PROBE: NOT DETECTED — SIGNIFICANT CHANGE UP
HPIV2 RNA SPEC QL NAA+PROBE: NOT DETECTED — SIGNIFICANT CHANGE UP
HPIV3 RNA SPEC QL NAA+PROBE: NOT DETECTED — SIGNIFICANT CHANGE UP
HPIV4 RNA SPEC QL NAA+PROBE: NOT DETECTED — SIGNIFICANT CHANGE UP
IMM GRANULOCYTES # BLD AUTO: 0.02 # — SIGNIFICANT CHANGE UP
IMM GRANULOCYTES NFR BLD AUTO: 0.3 % — SIGNIFICANT CHANGE UP (ref 0–1.5)
INR BLD: 1.23 — HIGH (ref 0.88–1.17)
KETONES UR-MCNC: NEGATIVE — SIGNIFICANT CHANGE UP
LACTATE BLDV-MCNC: 1.6 MMOL/L — SIGNIFICANT CHANGE UP (ref 0.5–2)
LEUKOCYTE ESTERASE UR-ACNC: HIGH
LYMPHOCYTES # BLD AUTO: 0.83 K/UL — LOW (ref 1–3.3)
LYMPHOCYTES # BLD AUTO: 14.3 % — SIGNIFICANT CHANGE UP (ref 13–44)
M PNEUMO DNA SPEC QL NAA+PROBE: NOT DETECTED — SIGNIFICANT CHANGE UP
MCHC RBC-ENTMCNC: 24.3 PG — LOW (ref 27–34)
MCHC RBC-ENTMCNC: 29.9 % — LOW (ref 32–36)
MCV RBC AUTO: 81.1 FL — SIGNIFICANT CHANGE UP (ref 80–100)
MONOCYTES # BLD AUTO: 0.11 K/UL — SIGNIFICANT CHANGE UP (ref 0–0.9)
MONOCYTES NFR BLD AUTO: 1.9 % — LOW (ref 2–14)
MUCOUS THREADS # UR AUTO: SIGNIFICANT CHANGE UP
NEUTROPHILS # BLD AUTO: 4.71 K/UL — SIGNIFICANT CHANGE UP (ref 1.8–7.4)
NEUTROPHILS NFR BLD AUTO: 81.4 % — HIGH (ref 43–77)
NITRITE UR-MCNC: NEGATIVE — SIGNIFICANT CHANGE UP
NRBC # FLD: 0 — SIGNIFICANT CHANGE UP
PCO2 BLDV: 63 MMHG — HIGH (ref 41–51)
PH BLDV: 7.36 PH — SIGNIFICANT CHANGE UP (ref 7.32–7.43)
PH UR: 6 — SIGNIFICANT CHANGE UP (ref 4.6–8)
PLATELET # BLD AUTO: 503 K/UL — HIGH (ref 150–400)
PMV BLD: 9.5 FL — SIGNIFICANT CHANGE UP (ref 7–13)
PO2 BLDV: 32 MMHG — LOW (ref 35–40)
POTASSIUM BLDV-SCNC: 4.5 MMOL/L — SIGNIFICANT CHANGE UP (ref 3.4–4.5)
POTASSIUM SERPL-MCNC: 4.8 MMOL/L — SIGNIFICANT CHANGE UP (ref 3.5–5.3)
POTASSIUM SERPL-SCNC: 4.8 MMOL/L — SIGNIFICANT CHANGE UP (ref 3.5–5.3)
PROT SERPL-MCNC: 7.2 G/DL — SIGNIFICANT CHANGE UP (ref 6–8.3)
PROT UR-MCNC: 20 — SIGNIFICANT CHANGE UP
PROTHROM AB SERPL-ACNC: 13.8 SEC — HIGH (ref 9.8–13.1)
RBC # BLD: 4.82 M/UL — SIGNIFICANT CHANGE UP (ref 3.8–5.2)
RBC # FLD: 18.4 % — HIGH (ref 10.3–14.5)
RBC CASTS # UR COMP ASSIST: SIGNIFICANT CHANGE UP (ref 0–?)
RSV RNA SPEC QL NAA+PROBE: NOT DETECTED — SIGNIFICANT CHANGE UP
RV+EV RNA SPEC QL NAA+PROBE: NOT DETECTED — SIGNIFICANT CHANGE UP
SAO2 % BLDV: 51.5 % — LOW (ref 60–85)
SODIUM SERPL-SCNC: 145 MMOL/L — SIGNIFICANT CHANGE UP (ref 135–145)
SP GR SPEC: > 1.04 — HIGH (ref 1–1.03)
SQUAMOUS # UR AUTO: SIGNIFICANT CHANGE UP
UROBILINOGEN FLD QL: NORMAL E.U. — SIGNIFICANT CHANGE UP (ref 0.1–0.2)
WBC # BLD: 5.79 K/UL — SIGNIFICANT CHANGE UP (ref 3.8–10.5)
WBC # FLD AUTO: 5.79 K/UL — SIGNIFICANT CHANGE UP (ref 3.8–10.5)
WBC UR QL: HIGH (ref 0–?)

## 2017-09-25 PROCEDURE — 99223 1ST HOSP IP/OBS HIGH 75: CPT

## 2017-09-25 PROCEDURE — 70450 CT HEAD/BRAIN W/O DYE: CPT | Mod: 26

## 2017-09-25 PROCEDURE — 99222 1ST HOSP IP/OBS MODERATE 55: CPT | Mod: GC

## 2017-09-25 PROCEDURE — 71020: CPT | Mod: 26

## 2017-09-25 PROCEDURE — 71275 CT ANGIOGRAPHY CHEST: CPT | Mod: 26

## 2017-09-25 RX ORDER — ACETAMINOPHEN 500 MG
1000 TABLET ORAL ONCE
Qty: 0 | Refills: 0 | Status: COMPLETED | OUTPATIENT
Start: 2017-09-25 | End: 2017-09-25

## 2017-09-25 RX ORDER — ENOXAPARIN SODIUM 100 MG/ML
40 INJECTION SUBCUTANEOUS EVERY 24 HOURS
Qty: 0 | Refills: 0 | Status: DISCONTINUED | OUTPATIENT
Start: 2017-09-25 | End: 2017-09-26

## 2017-09-25 RX ORDER — SODIUM CHLORIDE 9 MG/ML
1000 INJECTION INTRAMUSCULAR; INTRAVENOUS; SUBCUTANEOUS ONCE
Qty: 0 | Refills: 0 | Status: COMPLETED | OUTPATIENT
Start: 2017-09-25 | End: 2017-09-25

## 2017-09-25 RX ORDER — INFLUENZA VIRUS VACCINE 15; 15; 15; 15 UG/.5ML; UG/.5ML; UG/.5ML; UG/.5ML
0.5 SUSPENSION INTRAMUSCULAR ONCE
Qty: 0 | Refills: 0 | Status: DISCONTINUED | OUTPATIENT
Start: 2017-09-25 | End: 2017-09-26

## 2017-09-25 RX ORDER — IPRATROPIUM/ALBUTEROL SULFATE 18-103MCG
3 AEROSOL WITH ADAPTER (GRAM) INHALATION ONCE
Qty: 0 | Refills: 0 | Status: COMPLETED | OUTPATIENT
Start: 2017-09-25 | End: 2017-09-25

## 2017-09-25 RX ORDER — METOCLOPRAMIDE HCL 10 MG
10 TABLET ORAL ONCE
Qty: 0 | Refills: 0 | Status: COMPLETED | OUTPATIENT
Start: 2017-09-25 | End: 2017-09-25

## 2017-09-25 RX ADMIN — Medication 3 MILLILITER(S): at 14:42

## 2017-09-25 RX ADMIN — Medication 10 MILLIGRAM(S): at 04:18

## 2017-09-25 RX ADMIN — Medication 1000 MILLIGRAM(S): at 04:39

## 2017-09-25 RX ADMIN — SODIUM CHLORIDE 1000 MILLILITER(S): 9 INJECTION INTRAMUSCULAR; INTRAVENOUS; SUBCUTANEOUS at 04:14

## 2017-09-25 RX ADMIN — Medication 400 MILLIGRAM(S): at 04:18

## 2017-09-25 RX ADMIN — SODIUM CHLORIDE 1000 MILLILITER(S): 9 INJECTION INTRAMUSCULAR; INTRAVENOUS; SUBCUTANEOUS at 06:43

## 2017-09-25 NOTE — ED ADULT TRIAGE NOTE - CHIEF COMPLAINT QUOTE
Pt. brought to [facility] ED by SHEMAR for body aches, dizzyness, and unsteady gait. Stage 4 colon cancer with mets to liver and lung. Had chemotherapy on Thursday. Pt. appears comfortable at triage. Started Vectais on Thursday. Pt. thinks these symptoms are due to the medication. c/o back pain. Pt. appears comfortable at triage.

## 2017-09-25 NOTE — DISCHARGE NOTE ADULT - CARE PROVIDER_API CALL
[facility],   Phone: [phone]  Fax: (       - [facility],   Phone: [phone]  Fax: (   )    -    Dr MANOJ Lopez,   Phone: [phone]  Fax: (   )    -

## 2017-09-25 NOTE — H&P ADULT - PROBLEM SELECTOR PLAN 2
Initially concerning for infection given fever on rectal thermometer. However, lower suspicion given negative work up or focal infectious symptoms. No electrolyte derangements on chemistry. Likely an effect of chemotherapy. Patient states overall energy is improving. Will continue to encourage oral diet/intake, and ambulation as patient will tolerate.

## 2017-09-25 NOTE — CONSULT NOTE ADULT - CONSULT REASON
Continuity of care, metastatic colon cancer patient being treated at [facility] by Dr. Norman

## 2017-09-25 NOTE — CONSULT NOTE ADULT - PROBLEM SELECTOR RECOMMENDATION 9
Patient is being treated for metastatic colon cancer currently on Irinotecan and Panitumumab with last treatment around 5 days ago at [facility], she is not neutropenic  -would continue to trend vitals and check CBC tomorrow (9/26) AM, if 24 hour cultures are negative and vitals and labs are stable no oncologic objection to discharge, patient understands that if her cultures came back positive at a later time she would be called to come back to hospital. Chemotherapy on hold while inpatient    Cameron Mora  PGY-5, Hematology-Oncology Fellow  [phone] ([place]) [phone] ([facility])

## 2017-09-25 NOTE — ED ADULT NURSE NOTE - OBJECTIVE STATEMENT
Break coverage RN - pt received to room 12, AAOx3, c/o weakness/dizziness/difficulty breathing. pt denies any chest pain/abdominal pain. PMH Colon CA w. mets to liver and lungs. Last chemo on Thursday. skin appears warm/dry/intact. VS as noted, pt in NAD, 18G IV placed to L AC, labs sent, medicated per orders, will continue to monitor pt.

## 2017-09-25 NOTE — H&P ADULT - PROBLEM SELECTOR PLAN 4
Patient undergoing chemotherapy at [facility]. No neutropenia. Management of fever as above. Patient to undergo further chemotherapy as outpatient. She will f/u with her outpatient oncologist.

## 2017-09-25 NOTE — CONSULT NOTE ADULT - ASSESSMENT
58 yo F metasatic colon adenocarcinoma to liver and lungs was treated initially with FOLFOX and Bevacizumab but changed to Irinotecan and Panitumumab given progression of disease presents with complaint of lightheadedness that had worsened over 3 previous days, states last chemo was around 5 days ago and given by [facility]

## 2017-09-25 NOTE — PATIENT PROFILE ADULT. - TOBACCO USE
restart ace/BB once off inotrops/pressors  PRN diuresis  BP improved, consider weaning midodrine  likely d/c primacor today
restart ace/BB once off inotrops/pressors  diuress prn
restart ace/BB once off inotrops/pressors  diuress prn  Started midodrine for BP support now that off of epinephrine
resume daily diuresis  defer ACE/Home dose of norvasc till BP improves  EF improved   Started low dose coreg  Patient states her blood pressure normally runs in the 80s-90s systolic
resume daily diuress  defer ACE till BP improves  Start low dose coreg
Diurese as tolerated  Daily Is/Os  Daily Weights
Never smoker

## 2017-09-25 NOTE — DISCHARGE NOTE ADULT - PATIENT PORTAL LINK FT
“You can access the Follow[facility] Patient Portal, offered by [facility], by registering with the following website: [email or web address]”

## 2017-09-25 NOTE — DISCHARGE NOTE ADULT - CARE PLAN
Principal Discharge DX:	Fever, unspecified fever cause  Goal:	resolved symptoms  Instructions for follow-up, activity and diet:	Chest CT showed: Innumerable pulmonary nodules, compatible with metastatic disease,   increased in size and number from prior exam. Confluent patchy consolidations in the right upper lobe and bilateral lower lobes may be due to worsening metastatic disease or infection. Interval increase in metastatic hepatic lesions.  Secondary Diagnosis:	Metastatic colon cancer in female  Goal:	further follow up  Instructions for follow-up, activity and diet:	make a follow up appt with your oncologist in 1 week for further management. Call [phone] to schedule an appt Principal Discharge DX:	Fever, unspecified fever cause  Goal:	resolved symptoms  Instructions for follow-up, activity and diet:	Chest CT showed: Innumerable pulmonary nodules, compatible with metastatic disease,   increased in size and number from prior exam. Confluent patchy consolidations in the right upper lobe and bilateral lower lobes may be due to worsening metastatic disease or infection.   UTI start PO Levaquin 500 mg po qd and out patient f/u with Oncology in 1 week  Secondary Diagnosis:	Metastatic colon cancer in female  Goal:	further follow up  Instructions for follow-up, activity and diet:	make a follow up appt with your oncologist in 1 week for further management. Call [phone] to schedule an appt

## 2017-09-25 NOTE — H&P ADULT - ASSESSMENT
57 W PMH stage IV colon adenocarcinoma with metastases to liver and lung on chemotherapy presents with 3-day complaint of feeling unwell, dizziness. VS significant for rectal T100.4F. Labs without neutropenia or leukocytosis. CXR without PNA. CT chest without PE. No acute intracranial process on CTH.

## 2017-09-25 NOTE — DISCHARGE NOTE ADULT - ADDITIONAL INSTRUCTIONS
Please follow up with Oncology at [facility] in 1week or with Dr Traci denise at [facility] in 1 week

## 2017-09-25 NOTE — DISCHARGE NOTE ADULT - PROVIDER TOKENS
FREE:[LAST:[[facility]],PHONE:[phone]],FAX:[(   )    -]] FREE:[LAST:[[facility]],PHONE:[phone]],FAX:[(   )    -]],FREE:[LAST:[Dr MANOJ Lopez],PHONE:[phone]:[(   )    -]]

## 2017-09-25 NOTE — DISCHARGE NOTE ADULT - MEDICATION SUMMARY - MEDICATIONS TO TAKE
I will START or STAY ON the medications listed below when I get home from the hospital:    levoFLOXacin 500 mg oral tablet  -- 1 tab(s) by mouth every 24 hours  -- Indication: For UTI

## 2017-09-25 NOTE — DISCHARGE NOTE ADULT - HOSPITAL COURSE
57 W PMH stage IV colon adenocarcinoma with metastases to liver and lung on chemotherapy presents with 3-day complaint of feeling unwell. pt was given Levaquin, BC ___, Pt has been afebrile, symptoms significant improvement 57 W PMH stage IV colon adenocarcinoma with metastases to liver and lung on chemotherapy presents with 3-day complaint of feeling unwell. pt was given Levaquin, BC ___, Pt has been afebrile, symptoms significant improvement with iv hydration.  Urine - positive mod leukocyte. Blood cult - negative on 9/26/17.  patient wants to go HOME.  D/c home on Po levaquin x 5 days and out patient f/u with MD perfecto Lopez or at [facility] in 1 week 57 W PMH stage IV colon adenocarcinoma with metastases to liver and lung on chemotherapy presents with 3-day complaint of feeling unwell. pt was given Levaquin, BC ___, Pt has been afebrile, symptoms significant improvement with iv hydration.  Urine - positive mod leukocyte. Blood cult - negative on 9/26/17.MPRESSION: No pulmonary embolism.    Innumerable pulmonary nodules, compatible with metastatic disease,   increased in size and number from prior exam. Confluent patchy   consolidations in the right upper lobe and bilateral lower lobes may be   due to worsening metastatic disease or infection.    Interval increase in metastatic hepatic lesions.      Ct Chest-  patient wants to go HOME.  D/c home on Po levaquin x 5 days and out patient f/u with MD perfecto Lopez or [facility] week 57 W PMH stage IV colon adenocarcinoma with metastases to liver and lung on chemotherapy presents with 3-day complaint of feeling unwell. pt was given Levaquin, BC ___, Pt has been afebrile, symptoms significant improvement with iv hydration.  Urine - positive mod leukocyte. Blood cult - negative on 9/26/17.  CT of chest 9/5/17  IMPRESSION: No pulmonary embolism.  Innumerable pulmonary nodules, compatible with metastatic disease,   increased in size and number from prior exam. Confluent patchy   consolidations in the right upper lobe and bilateral lower lobes may be   due to worsening metastatic disease or infection.    Interval increase in metastatic hepatic lesions.  patient wants to go HOME.  D/c home on Po levaquin x 5 days and out patient f/u with MD perfecto Lopez or at [facility] in 1 week 57 W PMH stage IV colon adenocarcinoma with metastases to liver and lung on chemotherapy presents with 3-day complaint of feeling unwell. pt was given Levaquin, BC ___, Pt has been afebrile, symptoms significant improvement with iv hydration.  Urine - positive mod leukocyte. Blood cult - negative on 9/26/17.  CT of chest 9/5/17  IMPRESSION: No pulmonary embolism.  Innumerable pulmonary nodules, compatible with metastatic disease,   increased in size and number from prior exam. Confluent patchy   consolidations in the right upper lobe and bilateral lower lobes may be   due to worsening metastatic disease or infection.    Interval increase in metastatic hepatic lesions.  patient wants to go HOME.  D/c home on Po levaquin x 5 days and out patient f/u with MD perfecto Lopez or at [facility] in 1 week    Addendum 9/27/17  blood cult neg x 48 hrs.

## 2017-09-25 NOTE — DISCHARGE NOTE ADULT - PLAN OF CARE
resolved symptoms Chest CT showed: Innumerable pulmonary nodules, compatible with metastatic disease,   increased in size and number from prior exam. Confluent patchy consolidations in the right upper lobe and bilateral lower lobes may be due to worsening metastatic disease or infection. Interval increase in metastatic hepatic lesions. further follow up make a follow up appt with your oncologist in 1 week for further management. Call [phone] to schedule an appt Chest CT showed: Innumerable pulmonary nodules, compatible with metastatic disease,   increased in size and number from prior exam. Confluent patchy consolidations in the right upper lobe and bilateral lower lobes may be due to worsening metastatic disease or infection.   UTI start PO Levaquin 500 mg po qd and out patient f/u with Oncology in 1 week

## 2017-09-25 NOTE — ED ADULT NURSE NOTE - CHIEF COMPLAINT QUOTE
Pt. brought to [facility] ED by SHEMAR for body aches, dizzyness, and unsteady gait. Stage 4 colon cancer with mets to liver and lung. Had chemotherapy on Thursday. Pt. appears comfortable at triage. Started Vectais on Thursday. Pt. thinks these symptoms are due to the medication. c/o back pain. Pt. appears comfortable at triage.

## 2017-09-25 NOTE — CONSULT NOTE ADULT - ATTENDING COMMENTS
h/o metastatic colon cancer s/p FOLFOX and avastin, currently on irinotecan and panitumumab. Pt p/w lightheadedness which is likely due to poor oral intake. Will follow up.

## 2017-09-25 NOTE — H&P ADULT - PROBLEM SELECTOR PLAN 1
One time rectal T of 100.4F. No focal infectious symptoms or neutropenia. CXR without PNA. No PE on CTA. Legs without swelling to raise suspicion for DVT. May be related to extensive metastases. No indication for antibiotics. Will continue to observe.

## 2017-09-25 NOTE — H&P ADULT - HISTORY OF PRESENT ILLNESS
HPI:  57 W PMH stage IV colon adenocarcinoma with metastases to liver and lung on chemotherapy presents with 3-day complaint of feeling unwell. The patient states she received chemotherapy about 5 days ago at [facility]. She had previously undergone a cycle of irinotecan on May 8th (per her report), and was undergoing an additional cycle with a new chemotherapy agent 5-days ago. She states that about 2 days after the chemotherapy she began to feel dizzy and generally weak. The weakness and dizziness have lasted since 3 days ago and have begun to improve since admission from the ED. The dizziness does not worsen with movement, but is improved on staying seated. She reports no nausea or vomiting. No abdominal pain or diarrhea. She reports a chronically reduced appetite, but is trying to eat and drink more water. She reports no subjective fevers. She reports a chronic, non-productive cough which has not worsened. No recent travel.    EDVS: 100.4F rectal, 98.8F PO, 134/95, 125, 20, 97%  ED Course: Given levofloxacin    PAST MEDICAL & SURGICAL HISTORY:  Asthma  Anemia  H/O coronary angiogram: 2008    Review of Systems:   CONSTITUTIONAL: No subjective fever, weight loss; +generalized weakness  EYES: No eye pain, visual disturbances, or discharge  ENMT:  No difficulty hearing, tinnitus, vertigo; No sinus or throat pain  NECK: No pain or stiffness  BREASTS: No pain, masses, or nipple discharge  RESPIRATORY: +cough, no wheezing, chills or hemoptysis; No shortness of breath  CARDIOVASCULAR: No chest pain, palpitations, dizziness, or leg swelling  GASTROINTESTINAL: No abdominal or epigastric pain. No nausea, vomiting, or hematemesis; No diarrhea or constipation. No melena or hematochezia.  GENITOURINARY: No dysuria, frequency, hematuria, or incontinence  NEUROLOGICAL: +dizziness, No headaches, memory loss, loss of strength, numbness, or tremors  SKIN: No itching, burning, rashes, or lesions   LYMPH NODES: No enlarged glands  ENDOCRINE: No heat or cold intolerance; No hair loss  MUSCULOSKELETAL: No joint pain or swelling; No muscle, back, or extremity pain  PSYCHIATRIC: No depression, anxiety, mood swings, or difficulty sleeping  HEME/LYMPH: No easy bruising, or bleeding gums  ALLERGY AND IMMUNOLOGIC: No hives or eczema    Allergies    No Known Allergies    Intolerances        FAMILY HISTORY:  No pertinent family history in first degree relatives      MEDICATIONS  (STANDING):  influenza   Vaccine 0.5 milliLiter(s) IntraMuscular once  enoxaparin Injectable 40 milliGRAM(s) SubCutaneous every 24 hours    MEDICATIONS  (PRN):      T(C): 36.8 (09-25-17 @ 05:53), Max: 38 (09-25-17 @ 04:01)  HR: 116 (09-25-17 @ 05:53) (116 - 125)  BP: 146/104 (09-25-17 @ 05:53) (134/95 - 164/92)  RR: 18 (09-25-17 @ 05:53) (18 - 20)  SpO2: 98% (09-25-17 @ 05:53) (97% - 100%)    CAPILLARY BLOOD GLUCOSE        I&O's Summary      PHYSICAL EXAM:  GENERAL: NAD, well-developed  HEAD:  Atraumatic, Normocephalic  EYES: EOMI, PERRLA, conjunctiva and sclera clear  NECK: Supple, No elevated JVD  CHEST/LUNG: +port in right anterior chest, no surrounding erythema or fluctuance, non-tender; clear to auscultation bilaterally; No wheeze  HEART: Regular rate and rhythm; No murmurs, rubs, or gallops  ABDOMEN: Soft, Nontender, Nondistended; Bowel sounds present  EXTREMITIES:  2+ Peripheral Pulses, No clubbing, cyanosis, or edema  PSYCH: AAOx3  NEUROLOGY: CN II-XII grossly intact, moving all extremities  SKIN: No rashes or lesions    LABS:                        11.7   5.79  )-----------( 503      ( 25 Sep 2017 04:00 )             39.1     09-25    145  |  103  |  17  ----------------------------<  121<H>  4.8   |  34<H>  |  0.77    Ca    9.1      25 Sep 2017 04:00    TPro  7.2  /  Alb  3.3  /  TBili  0.5  /  DBili  x   /  AST  52<H>  /  ALT  32  /  AlkPhos  150<H>  09-25    PT/INR - ( 25 Sep 2017 04:00 )   PT: 13.8 SEC;   INR: 1.23          PTT - ( 25 Sep 2017 04:00 )  PTT:28.0 SEC          RADIOLOGY & ADDITIONAL TESTS:    ECG Sinus tachycardia    Imaging Personally Reviewed:  CXR - innumerable pulmonary nodules, increased from prior exam; port in SVC; no PNA    CT chest with contrast - no PE    CT head non-contrast - no acute intracranial process  Consultant(s) Notes Reviewed:      Care Discussed with Consultants/Other Providers:

## 2017-09-25 NOTE — CONSULT NOTE ADULT - SUBJECTIVE AND OBJECTIVE BOX
56 yo F metasatic colon adenocarcinoma to liver and lungs was treated initially with FOLFOX and Bevacizumab but changed to Irinotecan and Panitumumab given progression of disease presents with complaint of lightheadedness that had worsened over 3 previous days.    Currently patient states she feels much better and would like to go home, she was told she had fever when rectal temperature was taken, but denies feeling feverish or chills, also denies new cough, diarrhea, abdominal pain, dysuria or other infectious symptoms. Patient denies sick contacts at home or recent travel.    EDVS: 100.4F rectal, 98.8F PO, 134/95, 125, 20, 97%    PAST MEDICAL & SURGICAL HISTORY:  Asthma  Anemia  H/O coronary angiogram: 2008    Review of Systems:   CONSTITUTIONAL: No subjective fever, weight loss; +generalized weakness  EYES: No eye pain, visual disturbances, or discharge  ENMT:  No difficulty hearing, tinnitus, vertigo; No sinus or throat pain  NECK: No pain or stiffness  BREASTS: No pain, masses, or nipple discharge  RESPIRATORY: +cough, no wheezing, chills or hemoptysis; No shortness of breath  CARDIOVASCULAR: No chest pain, palpitations, dizziness, or leg swelling  GASTROINTESTINAL: No abdominal or epigastric pain. No nausea, vomiting, or hematemesis; No diarrhea or constipation. No melena or hematochezia.  GENITOURINARY: No dysuria, frequency, hematuria, or incontinence  NEUROLOGICAL: +dizziness, No headaches, memory loss, loss of strength, numbness, or tremors  SKIN: No itching, burning, rashes, or lesions   LYMPH NODES: No enlarged glands  ENDOCRINE: No heat or cold intolerance; No hair loss  MUSCULOSKELETAL: No joint pain or swelling; No muscle, back, or extremity pain  PSYCHIATRIC: No depression, anxiety, mood swings, or difficulty sleeping  HEME/LYMPH: No easy bruising, or bleeding gums  ALLERGY AND IMMUNOLOGIC: No hives or eczema    Allergies  No Known Allergies    FAMILY HISTORY:  No pertinent family history in first degree relatives      MEDICATIONS  (STANDING):  influenza   Vaccine 0.5 milliLiter(s) IntraMuscular once  enoxaparin Injectable 40 milliGRAM(s) SubCutaneous every 24 hours    T(C): 36.8 (09-25-17 @ 05:53), Max: 38 (09-25-17 @ 04:01)  HR: 116 (09-25-17 @ 05:53) (116 - 125)  BP: 146/104 (09-25-17 @ 05:53) (134/95 - 164/92)  RR: 18 (09-25-17 @ 05:53) (18 - 20)  SpO2: 98% (09-25-17 @ 05:53) (97% - 100%)    PHYSICAL EXAM:  GENERAL: NAD, well-developed  HEAD:  Atraumatic, Normocephalic  EYES: EOMI, PERRLA, conjunctiva and sclera clear  NECK: Supple  CHEST/LUNG: CTA bilaterally without R/W/R, R mediport in place without erythema or fluctuance  HEART: +S1S2 Regular rate and rhythm; No murmurs, rubs, or gallops  ABDOMEN: Soft, Nontender, Nondistended; Bowel sounds present  EXTREMITIES:  2+ Peripheral Pulses, No clubbing, cyanosis, or edema  PSYCH: AAOx3    LABS:                        11.7   5.79  )-----------( 503      ( 25 Sep 2017 04:00 )             39.1     09-25    145  |  103  |  17  ----------------------------<  121<H>  4.8   |  34<H>  |  0.77    Ca    9.1      25 Sep 2017 04:00    TPro  7.2  /  Alb  3.3  /  TBili  0.5  /  DBili  x   /  AST  52<H>  /  ALT  32  /  AlkPhos  150<H>  09-25    PT/INR - ( 25 Sep 2017 04:00 )   PT: 13.8 SEC;   INR: 1.23          PTT - ( 25 Sep 2017 04:00 )  PTT:28.0 SEC      RADIOLOGY & ADDITIONAL TESTS:    ECG Sinus tachycardia  < from: CT Angio Chest w/ IV Cont (09.25.17 @ 05:48) >  EXAM:  CT ANGIO CHEST (W)AW IC        PROCEDURE DATE:  Sep 25 2017         INTERPRETATION:  CLINICAL INFORMATION: Difficulty breathing, tachycardia,   history of colon cancer. Evaluate for pulmonary embolism.    COMPARISON: CT chest from 4/22/2017.    PROCEDURE:   CT Angiography of the Chest.  90 ml of Omnipaque 350 was injected intravenously. 10 ml were discarded.  Sagittal and coronal reformats were performed as well as 3D (MIP)   reconstructions.      FINDINGS:    CHEST:     LUNGS AND LARGE AIRWAYS: Patent central airways.  Innumerable bilateral   pulmonary nodules, which have increased in number and size since the   prior study. There is more confluent patchy consolidations in the right   upper lobe and bilateral lower lobes.  PLEURA: No pleural effusion.  VESSELS: The main pulmonary artery measures up to 3.1 cm, unchanged. No   pulmonary embolism. Right sided chemotherapy infusion catheter tip is in   the right atrium, unchanged.  HEART: Cardiomegaly. No pericardial effusion.  MEDIASTINUM AND ADAMS: No lymphadenopathy.  CHEST WALL AND LOWER NECK: Right anterior chest wall chemotherapy port.  VISUALIZED UPPER ABDOMEN: Multiple hypoattenuating hepatic lesions,   increased from the prior study. Left adrenal gland thickening, unchanged.  BONES: Within normal limits.    IMPRESSION: No pulmonary embolism.    Innumerable pulmonary nodules, compatible with metastatic disease,   increased in size and number from prior exam. Confluent patchy   consolidations in the right upper lobe and bilateral lower lobes may be   due to worsening metastatic disease or infection.    Interval increase in metastatic hepatic lesions.    AYE PFEIFFER M.D., RADIOLOGY RESIDENT  This document has been electronically signed.  ESTHER IRVING M.D., RADIOLOGIST  This document has been electronically signed. Sep 25 2017  6:21AM

## 2017-09-25 NOTE — ED PROVIDER NOTE - MEDICAL DECISION MAKING DETAILS
58 yo F with multiple complaints - sepsis, vs PE vs vertigo - CTA chest, CTH, labs - will hold abx until further work up

## 2017-09-25 NOTE — H&P ADULT - PROBLEM SELECTOR PLAN 3
Likely adverse effect of recent chemotherapy, possible dehydration (patient reports improvement after IVNS given in ED). Dizziness is present but milder per her report. No focality to neurologic exam. No acute intracranial process on CTH. Will continue to observe.

## 2017-09-26 VITALS — SYSTOLIC BLOOD PRESSURE: 140 MMHG | DIASTOLIC BLOOD PRESSURE: 100 MMHG

## 2017-09-26 LAB
SPECIMEN SOURCE: SIGNIFICANT CHANGE UP
SPECIMEN SOURCE: SIGNIFICANT CHANGE UP

## 2017-09-26 PROCEDURE — 99239 HOSP IP/OBS DSCHRG MGMT >30: CPT

## 2017-09-26 RX ORDER — HYDRALAZINE HCL 50 MG
5 TABLET ORAL ONCE
Qty: 0 | Refills: 0 | Status: COMPLETED | OUTPATIENT
Start: 2017-09-26 | End: 2017-09-26

## 2017-09-26 NOTE — PROGRESS NOTE ADULT - SUBJECTIVE AND OBJECTIVE BOX
Patient is a 57y old  Female who presents with a chief complaint of "I felt unwell" (25 Sep 2017 16:18)      SUBJECTIVE / OVERNIGHT EVENTS:  Patient insists he most go home.  She will take PO Levaquin for UTI and f/u with her Oncologist Dr Lauren Lopez at [facility]  She notes that she uses oxygen as needed and already have home oxygen.    MEDICATIONS  (STANDING):  influenza   Vaccine 0.5 milliLiter(s) IntraMuscular once  enoxaparin Injectable 40 milliGRAM(s) SubCutaneous every 24 hours    PHYSICAL EXAM:  Vital Signs Last 24 Hrs  T(C): 36.7 (26 Sep 2017 04:28), Max: 36.9 (25 Sep 2017 15:01)  T(F): 98.1 (26 Sep 2017 04:28), Max: 98.4 (25 Sep 2017 15:01)  HR: 116 (26 Sep 2017 04:28) (104 - 116)  BP: 140/100 (26 Sep 2017 05:21) (120/90 - 167/109)  BP(mean): --  RR: 18 (26 Sep 2017 04:28) (18 - 18)  SpO2: 98% (26 Sep 2017 04:28) (97% - 98%)  GENERAL: NAD, well-developed, on oxygen via NC-   HEAD:  Atraumatic, Normocephalic  EYES: EOMI, PERRLA, conjunctiva and sclera clear  NECK: Supple, No JVD  CHEST/LUNG: Clear to auscultation bilaterally; No wheeze  HEART: Regular rate and rhythm; No murmurs, rubs, or gallops  ABDOMEN: Soft, Nontender, Nondistended; Bowel sounds present  EXTREMITIES:  2+ Peripheral Pulses, No clubbing, cyanosis, or edema  PSYCH: AAOx3  NEUROLOGY: non-focal  SKIN: No rashes or lesions    LABS:                        11.7   5.79  )-----------( 503      ( 25 Sep 2017 04:00 )             39.1     09-25    145  |  103  |  17  ----------------------------<  121<H>  4.8   |  34<H>  |  0.77    Ca    9.1      25 Sep 2017 04:00    TPro  7.2  /  Alb  3.3  /  TBili  0.5  /  DBili  x   /  AST  52<H>  /  ALT  32  /  AlkPhos  150<H>  09-25    PT/INR - ( 25 Sep 2017 04:00 )   PT: 13.8 SEC;   INR: 1.23          PTT - ( 25 Sep 2017 04:00 )  PTT:28.0 SEC      Urinalysis Basic - ( 25 Sep 2017 13:00 )    Color: YELLOW / Appearance: CLEAR / SG: > 1.040 / pH: 6.0  Gluc: NEGATIVE / Ketone: NEGATIVE  / Bili: NEGATIVE / Urobili: NORMAL E.U.   Blood: NEGATIVE / Protein: 20 / Nitrite: NEGATIVE   Leuk Esterase: MODERATE / RBC: 2-5 / WBC 5-10   Sq Epi: OCC / Non Sq Epi: x / Bacteria: x        RADIOLOGY & ADDITIONAL TESTS:    Imaging Personally Reviewed:    Consultant(s) Notes Reviewed:      Care Discussed with Consultants/Other Providers:

## 2017-09-26 NOTE — PROGRESS NOTE ADULT - PROBLEM SELECTOR PLAN 1
One time rectal T of 100.4F. No focal infectious symptoms or neutropenia. CXR without PNA. No PE on CTA.   Mod leuk in Urine- fever most likely sec to UTI-start po Levaquin 500 mg po qd x 5 days.  blood cult neg x 24 hrs

## 2017-09-26 NOTE — PROGRESS NOTE ADULT - PROBLEM SELECTOR PLAN 3
Likely adverse effect of recent chemotherapy, possible dehydration (patient reports improvement after IVNS given in ED). Dizziness is present but milder per her report. No focality to neurologic exam. No acute intracranial process on CTH.  No dizziness today- patient wants to go HOME

## 2017-09-26 NOTE — PROGRESS NOTE ADULT - SUBJECTIVE AND OBJECTIVE BOX
Patient is a 57y old  Female who presents with a chief complaint of "I felt unwell" (25 Sep 2017 16:18)      SUBJECTIVE / OVERNIGHT EVENTS:    MEDICATIONS  (STANDING):  influenza   Vaccine 0.5 milliLiter(s) IntraMuscular once  enoxaparin Injectable 40 milliGRAM(s) SubCutaneous every 24 hours      PHYSICAL EXAM:  Vital Signs Last 24 Hrs  T(C): 36.7 (26 Sep 2017 04:28), Max: 36.9 (25 Sep 2017 15:01)  T(F): 98.1 (26 Sep 2017 04:28), Max: 98.4 (25 Sep 2017 15:01)  HR: 116 (26 Sep 2017 04:28) (104 - 116)  BP: 140/100 (26 Sep 2017 05:21) (120/90 - 167/109)  BP(mean): --  RR: 18 (26 Sep 2017 04:28) (18 - 18)  SpO2: 98% (26 Sep 2017 04:28) (97% - 98%)  GENERAL: NAD, well-developed  HEAD:  Atraumatic, Normocephalic  EYES: EOMI, PERRLA, conjunctiva and sclera clear  NECK: Supple, No JVD  CHEST/LUNG: Clear to auscultation bilaterally; No wheeze  HEART: Regular rate and rhythm; No murmurs, rubs, or gallops  ABDOMEN: Soft, Nontender, Nondistended; Bowel sounds present  EXTREMITIES:  2+ Peripheral Pulses, No clubbing, cyanosis, or edema  PSYCH: AAOx3  NEUROLOGY: non-focal  SKIN: No rashes or lesions    LABS:                        11.7   5.79  )-----------( 503      ( 25 Sep 2017 04:00 )             39.1     09-25    145  |  103  |  17  ----------------------------<  121<H>  4.8   |  34<H>  |  0.77    Ca    9.1      25 Sep 2017 04:00    TPro  7.2  /  Alb  3.3  /  TBili  0.5  /  DBili  x   /  AST  52<H>  /  ALT  32  /  AlkPhos  150<H>  09-25    PT/INR - ( 25 Sep 2017 04:00 )   PT: 13.8 SEC;   INR: 1.23          PTT - ( 25 Sep 2017 04:00 )  PTT:28.0 SEC      Urinalysis Basic - ( 25 Sep 2017 13:00 )    Color: YELLOW / Appearance: CLEAR / SG: > 1.040 / pH: 6.0  Gluc: NEGATIVE / Ketone: NEGATIVE  / Bili: NEGATIVE / Urobili: NORMAL E.U.   Blood: NEGATIVE / Protein: 20 / Nitrite: NEGATIVE   Leuk Esterase: MODERATE / RBC: 2-5 / WBC 5-10   Sq Epi: OCC / Non Sq Epi: x / Bacteria: x        RADIOLOGY & ADDITIONAL TESTS:    Imaging Personally Reviewed:    Consultant(s) Notes Reviewed:      Care Discussed with Consultants/Other Providers:

## 2017-09-30 LAB
BACTERIA BLD CULT: SIGNIFICANT CHANGE UP
BACTERIA BLD CULT: SIGNIFICANT CHANGE UP

## 2017-10-30 ENCOUNTER — EMERGENCY (EMERGENCY)
Facility: HOSPITAL | Age: 57
LOS: 1 days | Discharge: LEFT BEFORE DISCHARGE | End: 2017-10-30
Attending: EMERGENCY MEDICINE | Admitting: EMERGENCY MEDICINE
Payer: COMMERCIAL

## 2017-10-30 VITALS
RESPIRATION RATE: 23 BRPM | DIASTOLIC BLOOD PRESSURE: 101 MMHG | OXYGEN SATURATION: 99 % | HEART RATE: 117 BPM | SYSTOLIC BLOOD PRESSURE: 129 MMHG

## 2017-10-30 VITALS
TEMPERATURE: 98 F | DIASTOLIC BLOOD PRESSURE: 102 MMHG | RESPIRATION RATE: 22 BRPM | SYSTOLIC BLOOD PRESSURE: 152 MMHG | HEART RATE: 124 BPM | OXYGEN SATURATION: 100 %

## 2017-10-30 DIAGNOSIS — Z98.89 OTHER SPECIFIED POSTPROCEDURAL STATES: Chronic | ICD-10-CM

## 2017-10-30 PROCEDURE — 99285 EMERGENCY DEPT VISIT HI MDM: CPT | Mod: 25

## 2017-10-30 NOTE — ED PROVIDER NOTE - PROGRESS NOTE DETAILS
MAYRA note: 57F with LUQ abd pain and SOB. Patient with metastatic colon ca on chemotx, chf. pain since this evening. check cbc, cmp, vbg, lipase, CTA to r/o PE, CT A/p.

## 2017-10-30 NOTE — ED ADULT TRIAGE NOTE - CHIEF COMPLAINT QUOTE
BIBEMS from home for c/o SOB and LLQ abd pain ~1hr prior to arrival. Denies CP/Fevers/Chills.  PMHx Lung CA w/ mets to colon/liver.  Recent hospital admission [facility] 10/16 for PNA w/ pleural effusions.  Pt rcvd on nonrebreather, sat 100%, at baseline is on 2L O2 NC.  Tachypneic and Tachycardic in triage.

## 2017-10-30 NOTE — ED PROVIDER NOTE - OBJECTIVE STATEMENT
57yoF h/o metastatic colon cancer presenting with acute abdominal 1hr pta. Pt reports she began to experience sudden abdominal this PM. It has been constant since onset but its severity started as 10/10 and has reduced to 5/10 now. She reports the pain increases with abdominal palpation and coughing. Nothing makes it better. She last had a BM today and states her BMs have been regular and without blood. She has not had any nausea or vomiting since the onset of her symptoms. Pt has metastatic colon cancer w/ mets to the lungs and liver. She has been undergoing various chemo courses since 10/16 and has not received any XR. She is on 2L O2 while at rest and 5L while walking. She denies f/c, HA, CP, SOB greater than baseline, urinary sxs, or any other concerns.    PCP: none  [facility], [phone] 57yoF h/o metastatic colon cancer presenting with acute abdominal 1hr pta. Pt reports she began to experience sudden abdominal this PM. It has been constant since onset but its severity started as 10/10 and has reduced to 5/10 now. She reports the pain increases with abdominal palpation and coughing. Nothing makes it better. She last had a BM today and states her BMs have been regular and without blood. She has not had any nausea or vomiting since the onset of her symptoms. Pt has metastatic colon cancer w/ mets to the lungs and liver. She has been undergoing various chemo courses since 10/16 and has not received any XR. She is on 2L O2 while at rest and 5L while walking. Pt was admitted at [facility] 10/16-19 for pneumonia and pulmonary effusions. She reports only receiving abx and invasive procedures. She denies f/c, HA, CP, SOB greater than baseline, urinary sxs, or any other concerns.    PCP: none  Onc: Esther Lopez [facility], [phone]

## 2017-10-30 NOTE — ED PROVIDER NOTE - MEDICAL DECISION MAKING DETAILS
57yoF h/o metastatic colon cancer presenting with acute abdominal 1hr pta. Tachycardic on arrival. Labs and CT pending.

## 2017-10-30 NOTE — ED PROVIDER NOTE - ATTENDING CONTRIBUTION TO CARE
58yo F PMH: metastatic colon cancer presenting with acute abdominal and cough. Patient recently admitted for PN/pleural effusion, reports abdominal pain not common for her  On exam awake & alert, mild distress, PERRL, mmm, lungs trace diffuse rales, RRR, abdomen soft NT/ND no rebound no guarding,  2+ pulses b/l, neuro A&Ox3, no focal deficits,  skin warm and dry no rash

## 2017-10-30 NOTE — ED ADULT NURSE NOTE - CHIEF COMPLAINT QUOTE
BIBEMS from home for c/o SOB and LLQ abd pain ~1hr prior to arrival. Denies CP/Fevers/Chills.  PMHx Lung CA w/ mets to colon/liver.  Recent hospital admission [facility] 10/16 for PNA w/ pleural effusions.  Pt rcvd on nonrebreather, sat 100%, at baseline is on 2L O2 NC.  Tachypneic and Tachycardic in triage.

## 2017-10-30 NOTE — ED PROVIDER NOTE - CARE PLAN
Principal Discharge DX:	Colon cancer metastasized to multiple sites Principal Discharge DX:	Abdominal pain

## 2017-10-30 NOTE — ED PROVIDER NOTE - CONDUCTED A DETAILED DISCUSSION WITH PATIENT AND/OR GUARDIAN REGARDING, MDM
lab results/radiology results radiology results/need for outpatient follow-up/lab results/need to admit

## 2017-10-30 NOTE — ED PROVIDER NOTE - CHPI ED SYMPTOMS NEG
no hematuria/no dysuria/no chills/no diarrhea/no nausea/no fever/no burning urination/no blood in stool/no vomiting

## 2017-10-30 NOTE — ED ADULT NURSE NOTE - OBJECTIVE STATEMENT
Patient received to room 14, Aox4 and ambulatory. Comes in c/o tender left sided abd pain. States colon CA which has spread to lungs. States also has SOB and was dx and hospitalized this past month for pleural effusions. Breathing even and nonlabored on 4liters NC spo2=99%. States uses 2liters at home. Denies fever/chills. IV 20g started in left ac, labs drawn and sent.

## 2017-10-31 DIAGNOSIS — Z87.59 PERSONAL HISTORY OF OTHER COMPLICATIONS OF PREGNANCY, CHILDBIRTH AND THE PUERPERIUM: Chronic | ICD-10-CM

## 2017-10-31 LAB
ALBUMIN SERPL ELPH-MCNC: 3.2 G/DL — LOW (ref 3.3–5)
ALP SERPL-CCNC: 213 U/L — HIGH (ref 40–120)
ALT FLD-CCNC: 19 U/L — SIGNIFICANT CHANGE UP (ref 4–33)
AMORPH CRY # UR COMP ASSIST: SIGNIFICANT CHANGE UP (ref 0–0)
APPEARANCE UR: SIGNIFICANT CHANGE UP
AST SERPL-CCNC: 57 U/L — HIGH (ref 4–32)
BACTERIA # UR AUTO: SIGNIFICANT CHANGE UP
BASE EXCESS BLDV CALC-SCNC: 12.2 MMOL/L — SIGNIFICANT CHANGE UP
BASOPHILS # BLD AUTO: 0.04 K/UL — SIGNIFICANT CHANGE UP (ref 0–0.2)
BASOPHILS NFR BLD AUTO: 0.3 % — SIGNIFICANT CHANGE UP (ref 0–2)
BILIRUB SERPL-MCNC: 0.4 MG/DL — SIGNIFICANT CHANGE UP (ref 0.2–1.2)
BILIRUB UR-MCNC: NEGATIVE — SIGNIFICANT CHANGE UP
BLOOD GAS VENOUS - CREATININE: 0.5 MG/DL — SIGNIFICANT CHANGE UP (ref 0.5–1.3)
BLOOD UR QL VISUAL: NEGATIVE — SIGNIFICANT CHANGE UP
BUN SERPL-MCNC: 9 MG/DL — SIGNIFICANT CHANGE UP (ref 7–23)
CALCIUM SERPL-MCNC: 9.5 MG/DL — SIGNIFICANT CHANGE UP (ref 8.4–10.5)
CHLORIDE BLDV-SCNC: 98 MMOL/L — SIGNIFICANT CHANGE UP (ref 96–108)
CHLORIDE SERPL-SCNC: 96 MMOL/L — LOW (ref 98–107)
CO2 SERPL-SCNC: 34 MMOL/L — HIGH (ref 22–31)
COLOR SPEC: YELLOW — SIGNIFICANT CHANGE UP
CREAT SERPL-MCNC: 0.53 MG/DL — SIGNIFICANT CHANGE UP (ref 0.5–1.3)
EOSINOPHIL # BLD AUTO: 0.23 K/UL — SIGNIFICANT CHANGE UP (ref 0–0.5)
EOSINOPHIL NFR BLD AUTO: 1.9 % — SIGNIFICANT CHANGE UP (ref 0–6)
GAS PNL BLDV: 141 MMOL/L — SIGNIFICANT CHANGE UP (ref 136–146)
GLUCOSE BLDV-MCNC: 102 — HIGH (ref 70–99)
GLUCOSE SERPL-MCNC: 105 MG/DL — HIGH (ref 70–99)
GLUCOSE UR-MCNC: NEGATIVE — SIGNIFICANT CHANGE UP
HCO3 BLDV-SCNC: 33 MMOL/L — HIGH (ref 20–27)
HCT VFR BLD CALC: 37 % — SIGNIFICANT CHANGE UP (ref 34.5–45)
HCT VFR BLDV CALC: 34.7 % — SIGNIFICANT CHANGE UP (ref 34.5–45)
HGB BLD-MCNC: 10.7 G/DL — LOW (ref 11.5–15.5)
HGB BLDV-MCNC: 11.2 G/DL — LOW (ref 11.5–15.5)
HYALINE CASTS # UR AUTO: SIGNIFICANT CHANGE UP (ref 0–?)
IMM GRANULOCYTES # BLD AUTO: 0.09 # — SIGNIFICANT CHANGE UP
IMM GRANULOCYTES NFR BLD AUTO: 0.7 % — SIGNIFICANT CHANGE UP (ref 0–1.5)
KETONES UR-MCNC: NEGATIVE — SIGNIFICANT CHANGE UP
LACTATE BLDV-MCNC: 2.5 MMOL/L — HIGH (ref 0.5–2)
LEUKOCYTE ESTERASE UR-ACNC: SIGNIFICANT CHANGE UP
LYMPHOCYTES # BLD AUTO: 1.1 K/UL — SIGNIFICANT CHANGE UP (ref 1–3.3)
LYMPHOCYTES # BLD AUTO: 8.8 % — LOW (ref 13–44)
MCHC RBC-ENTMCNC: 23.8 PG — LOW (ref 27–34)
MCHC RBC-ENTMCNC: 28.9 % — LOW (ref 32–36)
MCV RBC AUTO: 82.2 FL — SIGNIFICANT CHANGE UP (ref 80–100)
MONOCYTES # BLD AUTO: 0.92 K/UL — HIGH (ref 0–0.9)
MONOCYTES NFR BLD AUTO: 7.4 % — SIGNIFICANT CHANGE UP (ref 2–14)
MUCOUS THREADS # UR AUTO: SIGNIFICANT CHANGE UP
NEUTROPHILS # BLD AUTO: 10.05 K/UL — HIGH (ref 1.8–7.4)
NEUTROPHILS NFR BLD AUTO: 80.9 % — HIGH (ref 43–77)
NITRITE UR-MCNC: NEGATIVE — SIGNIFICANT CHANGE UP
NRBC # FLD: 0 — SIGNIFICANT CHANGE UP
PCO2 BLDV: 73 MMHG — HIGH (ref 41–51)
PH BLDV: 7.34 PH — SIGNIFICANT CHANGE UP (ref 7.32–7.43)
PH UR: 6 — SIGNIFICANT CHANGE UP (ref 4.6–8)
PLATELET # BLD AUTO: 459 K/UL — HIGH (ref 150–400)
PMV BLD: 10.1 FL — SIGNIFICANT CHANGE UP (ref 7–13)
PO2 BLDV: 35 MMHG — SIGNIFICANT CHANGE UP (ref 35–40)
POTASSIUM BLDV-SCNC: 4 MMOL/L — SIGNIFICANT CHANGE UP (ref 3.4–4.5)
POTASSIUM SERPL-MCNC: 4.5 MMOL/L — SIGNIFICANT CHANGE UP (ref 3.5–5.3)
POTASSIUM SERPL-SCNC: 4.5 MMOL/L — SIGNIFICANT CHANGE UP (ref 3.5–5.3)
PROT SERPL-MCNC: 7.5 G/DL — SIGNIFICANT CHANGE UP (ref 6–8.3)
PROT UR-MCNC: 20 — SIGNIFICANT CHANGE UP
RBC # BLD: 4.5 M/UL — SIGNIFICANT CHANGE UP (ref 3.8–5.2)
RBC # FLD: 20.5 % — HIGH (ref 10.3–14.5)
RBC CASTS # UR COMP ASSIST: SIGNIFICANT CHANGE UP (ref 0–?)
SAO2 % BLDV: 55.1 % — LOW (ref 60–85)
SODIUM SERPL-SCNC: 142 MMOL/L — SIGNIFICANT CHANGE UP (ref 135–145)
SP GR SPEC: 1.01 — SIGNIFICANT CHANGE UP (ref 1–1.03)
SQUAMOUS # UR AUTO: SIGNIFICANT CHANGE UP
UROBILINOGEN FLD QL: NORMAL E.U. — SIGNIFICANT CHANGE UP (ref 0.1–0.2)
WBC # BLD: 12.43 K/UL — HIGH (ref 3.8–10.5)
WBC # FLD AUTO: 12.43 K/UL — HIGH (ref 3.8–10.5)
WBC UR QL: SIGNIFICANT CHANGE UP (ref 0–?)

## 2017-10-31 PROCEDURE — 74177 CT ABD & PELVIS W/CONTRAST: CPT | Mod: 26

## 2017-10-31 PROCEDURE — 71020: CPT | Mod: 26

## 2017-10-31 PROCEDURE — 71275 CT ANGIOGRAPHY CHEST: CPT | Mod: 26

## 2017-10-31 RX ORDER — FUROSEMIDE 40 MG
20 TABLET ORAL ONCE
Qty: 0 | Refills: 0 | Status: COMPLETED | OUTPATIENT
Start: 2017-10-31 | End: 2017-10-31

## 2017-10-31 RX ORDER — PIPERACILLIN AND TAZOBACTAM 4; .5 G/20ML; G/20ML
4.5 INJECTION, POWDER, LYOPHILIZED, FOR SOLUTION INTRAVENOUS ONCE
Qty: 0 | Refills: 0 | Status: DISCONTINUED | OUTPATIENT
Start: 2017-10-31 | End: 2017-10-31

## 2017-10-31 RX ORDER — VANCOMYCIN HCL 1 G
1000 VIAL (EA) INTRAVENOUS ONCE
Qty: 0 | Refills: 0 | Status: COMPLETED | OUTPATIENT
Start: 2017-10-31 | End: 2017-10-31

## 2017-10-31 RX ORDER — SODIUM CHLORIDE 9 MG/ML
500 INJECTION INTRAMUSCULAR; INTRAVENOUS; SUBCUTANEOUS ONCE
Qty: 0 | Refills: 0 | Status: COMPLETED | OUTPATIENT
Start: 2017-10-31 | End: 2017-10-31

## 2017-10-31 RX ORDER — PIPERACILLIN AND TAZOBACTAM 4; .5 G/20ML; G/20ML
3.38 INJECTION, POWDER, LYOPHILIZED, FOR SOLUTION INTRAVENOUS ONCE
Qty: 0 | Refills: 0 | Status: COMPLETED | OUTPATIENT
Start: 2017-10-31 | End: 2017-10-31

## 2017-10-31 RX ADMIN — Medication 250 MILLIGRAM(S): at 05:01

## 2017-10-31 RX ADMIN — PIPERACILLIN AND TAZOBACTAM 200 GRAM(S): 4; .5 INJECTION, POWDER, LYOPHILIZED, FOR SOLUTION INTRAVENOUS at 04:37

## 2017-10-31 RX ADMIN — Medication 20 MILLIGRAM(S): at 00:42

## 2017-10-31 RX ADMIN — SODIUM CHLORIDE 500 MILLILITER(S): 9 INJECTION INTRAMUSCULAR; INTRAVENOUS; SUBCUTANEOUS at 03:36

## 2017-11-01 LAB
BACTERIA UR CULT: SIGNIFICANT CHANGE UP
SPECIMEN SOURCE: SIGNIFICANT CHANGE UP

## 2017-11-02 LAB
-  AMIKACIN: SIGNIFICANT CHANGE UP
-  AMPICILLIN/SULBACTAM: SIGNIFICANT CHANGE UP
-  AMPICILLIN: SIGNIFICANT CHANGE UP
-  AZTREONAM: SIGNIFICANT CHANGE UP
-  CEFAZOLIN: SIGNIFICANT CHANGE UP
-  CEFEPIME: SIGNIFICANT CHANGE UP
-  CEFOXITIN: SIGNIFICANT CHANGE UP
-  CEFTAZIDIME: SIGNIFICANT CHANGE UP
-  CEFTRIAXONE: SIGNIFICANT CHANGE UP
-  CIPROFLOXACIN: SIGNIFICANT CHANGE UP
-  ERTAPENEM: SIGNIFICANT CHANGE UP
-  GENTAMICIN: SIGNIFICANT CHANGE UP
-  IMIPENEM: SIGNIFICANT CHANGE UP
-  LEVOFLOXACIN: SIGNIFICANT CHANGE UP
-  MEROPENEM: SIGNIFICANT CHANGE UP
-  NITROFURANTOIN: SIGNIFICANT CHANGE UP
-  PIPERACILLIN/TAZOBACTAM: SIGNIFICANT CHANGE UP
-  TIGECYCLINE: SIGNIFICANT CHANGE UP
-  TOBRAMYCIN: SIGNIFICANT CHANGE UP
-  TRIMETHOPRIM/SULFAMETHOXAZOLE: SIGNIFICANT CHANGE UP
BACTERIA UR CULT: SIGNIFICANT CHANGE UP
METHOD TYPE: SIGNIFICANT CHANGE UP
ORGANISM # SPEC MICROSCOPIC CNT: SIGNIFICANT CHANGE UP
ORGANISM # SPEC MICROSCOPIC CNT: SIGNIFICANT CHANGE UP

## 2017-11-02 NOTE — ED POST DISCHARGE NOTE - RESULT SUMMARY
Admin MITCH Delacruz- >100,000 colony count ecoli- pt signed out AMA. No ABX was rx'ed. LM for immediate call back

## 2017-11-03 RX ORDER — CEPHALEXIN 500 MG
1 CAPSULE ORAL
Qty: 14 | Refills: 0 | OUTPATIENT
Start: 2017-11-03 | End: 2017-11-10

## 2017-11-05 LAB
BACTERIA BLD CULT: SIGNIFICANT CHANGE UP
BACTERIA BLD CULT: SIGNIFICANT CHANGE UP

## 2017-11-11 ENCOUNTER — INPATIENT (INPATIENT)
Facility: HOSPITAL | Age: 57
LOS: 3 days | Discharge: HOME | End: 2017-11-15
Attending: INTERNAL MEDICINE | Admitting: INTERNAL MEDICINE
Payer: COMMERCIAL

## 2017-11-11 VITALS
SYSTOLIC BLOOD PRESSURE: 181 MMHG | TEMPERATURE: 98 F | OXYGEN SATURATION: 100 % | DIASTOLIC BLOOD PRESSURE: 117 MMHG | HEART RATE: 131 BPM | RESPIRATION RATE: 32 BRPM

## 2017-11-11 DIAGNOSIS — D63.0 ANEMIA IN NEOPLASTIC DISEASE: ICD-10-CM

## 2017-11-11 DIAGNOSIS — Z71.89 OTHER SPECIFIED COUNSELING: ICD-10-CM

## 2017-11-11 DIAGNOSIS — Z98.89 OTHER SPECIFIED POSTPROCEDURAL STATES: Chronic | ICD-10-CM

## 2017-11-11 DIAGNOSIS — C18.9 MALIGNANT NEOPLASM OF COLON, UNSPECIFIED: ICD-10-CM

## 2017-11-11 DIAGNOSIS — J45.901 UNSPECIFIED ASTHMA WITH (ACUTE) EXACERBATION: ICD-10-CM

## 2017-11-11 DIAGNOSIS — J96.21 ACUTE AND CHRONIC RESPIRATORY FAILURE WITH HYPOXIA: ICD-10-CM

## 2017-11-11 DIAGNOSIS — D68.59 OTHER PRIMARY THROMBOPHILIA: ICD-10-CM

## 2017-11-11 DIAGNOSIS — E87.2 ACIDOSIS: ICD-10-CM

## 2017-11-11 DIAGNOSIS — R14.0 ABDOMINAL DISTENSION (GASEOUS): ICD-10-CM

## 2017-11-11 DIAGNOSIS — Z29.9 ENCOUNTER FOR PROPHYLACTIC MEASURES, UNSPECIFIED: ICD-10-CM

## 2017-11-11 DIAGNOSIS — N39.0 URINARY TRACT INFECTION, SITE NOT SPECIFIED: ICD-10-CM

## 2017-11-11 DIAGNOSIS — M79.89 OTHER SPECIFIED SOFT TISSUE DISORDERS: ICD-10-CM

## 2017-11-11 DIAGNOSIS — Z87.59 PERSONAL HISTORY OF OTHER COMPLICATIONS OF PREGNANCY, CHILDBIRTH AND THE PUERPERIUM: Chronic | ICD-10-CM

## 2017-11-11 LAB
ALBUMIN SERPL ELPH-MCNC: 2.8 G/DL — LOW (ref 3.3–5)
ALP SERPL-CCNC: 314 U/L — HIGH (ref 40–120)
ALT FLD-CCNC: 26 U/L — SIGNIFICANT CHANGE UP (ref 4–33)
APPEARANCE UR: CLEAR — SIGNIFICANT CHANGE UP
APTT BLD: 27.7 SEC — SIGNIFICANT CHANGE UP (ref 27.5–37.4)
AST SERPL-CCNC: 74 U/L — HIGH (ref 4–32)
B PERT DNA SPEC QL NAA+PROBE: SIGNIFICANT CHANGE UP
BASE EXCESS BLDCOV CALC-SCNC: 14.7 MMOL/L — HIGH (ref -9.3–0.3)
BASE EXCESS BLDV CALC-SCNC: 16.6 MMOL/L — SIGNIFICANT CHANGE UP
BASOPHILS # BLD AUTO: 0.03 K/UL — SIGNIFICANT CHANGE UP (ref 0–0.2)
BASOPHILS NFR BLD AUTO: 0.3 % — SIGNIFICANT CHANGE UP (ref 0–2)
BILIRUB SERPL-MCNC: 0.5 MG/DL — SIGNIFICANT CHANGE UP (ref 0.2–1.2)
BILIRUB UR-MCNC: NEGATIVE — SIGNIFICANT CHANGE UP
BLOOD GAS VENOUS - CREATININE: 0.41 MG/DL — LOW (ref 0.5–1.3)
BLOOD UR QL VISUAL: NEGATIVE — SIGNIFICANT CHANGE UP
BUN SERPL-MCNC: 9 MG/DL — SIGNIFICANT CHANGE UP (ref 7–23)
C PNEUM DNA SPEC QL NAA+PROBE: NOT DETECTED — SIGNIFICANT CHANGE UP
CALCIUM SERPL-MCNC: 9 MG/DL — SIGNIFICANT CHANGE UP (ref 8.4–10.5)
CHLORIDE BLDV-SCNC: 98 MMOL/L — SIGNIFICANT CHANGE UP (ref 96–108)
CHLORIDE SERPL-SCNC: 97 MMOL/L — LOW (ref 98–107)
CK MB BLD-MCNC: 1.85 NG/ML — SIGNIFICANT CHANGE UP (ref 1–4.7)
CK MB BLD-MCNC: SIGNIFICANT CHANGE UP (ref 0–2.5)
CK SERPL-CCNC: 61 U/L — SIGNIFICANT CHANGE UP (ref 25–170)
CO2 SERPL-SCNC: 36 MMOL/L — HIGH (ref 22–31)
COLOR SPEC: YELLOW — SIGNIFICANT CHANGE UP
CREAT SERPL-MCNC: 0.5 MG/DL — SIGNIFICANT CHANGE UP (ref 0.5–1.3)
EOSINOPHIL # BLD AUTO: 0.34 K/UL — SIGNIFICANT CHANGE UP (ref 0–0.5)
EOSINOPHIL NFR BLD AUTO: 3.2 % — SIGNIFICANT CHANGE UP (ref 0–6)
FLUAV H1 2009 PAND RNA SPEC QL NAA+PROBE: NOT DETECTED — SIGNIFICANT CHANGE UP
FLUAV H1 RNA SPEC QL NAA+PROBE: NOT DETECTED — SIGNIFICANT CHANGE UP
FLUAV H3 RNA SPEC QL NAA+PROBE: NOT DETECTED — SIGNIFICANT CHANGE UP
FLUAV SUBTYP SPEC NAA+PROBE: SIGNIFICANT CHANGE UP
FLUBV RNA SPEC QL NAA+PROBE: NOT DETECTED — SIGNIFICANT CHANGE UP
GAS PNL BLDV: 142 MMOL/L — SIGNIFICANT CHANGE UP (ref 136–146)
GLUCOSE BLDV-MCNC: 100 — HIGH (ref 70–99)
GLUCOSE SERPL-MCNC: 96 MG/DL — SIGNIFICANT CHANGE UP (ref 70–99)
GLUCOSE UR-MCNC: NEGATIVE — SIGNIFICANT CHANGE UP
HADV DNA SPEC QL NAA+PROBE: NOT DETECTED — SIGNIFICANT CHANGE UP
HCO3 BLDV-SCNC: 37 MMOL/L — HIGH (ref 20–27)
HCOV 229E RNA SPEC QL NAA+PROBE: NOT DETECTED — SIGNIFICANT CHANGE UP
HCOV HKU1 RNA SPEC QL NAA+PROBE: NOT DETECTED — SIGNIFICANT CHANGE UP
HCOV NL63 RNA SPEC QL NAA+PROBE: NOT DETECTED — SIGNIFICANT CHANGE UP
HCOV OC43 RNA SPEC QL NAA+PROBE: NOT DETECTED — SIGNIFICANT CHANGE UP
HCT VFR BLD CALC: 36.2 % — SIGNIFICANT CHANGE UP (ref 34.5–45)
HCT VFR BLDV CALC: 34.4 % — LOW (ref 34.5–45)
HGB BLD-MCNC: 10.4 G/DL — LOW (ref 11.5–15.5)
HGB BLDV-MCNC: 11.2 G/DL — LOW (ref 11.5–15.5)
HMPV RNA SPEC QL NAA+PROBE: NOT DETECTED — SIGNIFICANT CHANGE UP
HPIV1 RNA SPEC QL NAA+PROBE: NOT DETECTED — SIGNIFICANT CHANGE UP
HPIV2 RNA SPEC QL NAA+PROBE: NOT DETECTED — SIGNIFICANT CHANGE UP
HPIV3 RNA SPEC QL NAA+PROBE: NOT DETECTED — SIGNIFICANT CHANGE UP
HPIV4 RNA SPEC QL NAA+PROBE: NOT DETECTED — SIGNIFICANT CHANGE UP
IMM GRANULOCYTES # BLD AUTO: 0.08 # — SIGNIFICANT CHANGE UP
IMM GRANULOCYTES NFR BLD AUTO: 0.8 % — SIGNIFICANT CHANGE UP (ref 0–1.5)
INR BLD: 1.21 — HIGH (ref 0.88–1.17)
KETONES UR-MCNC: NEGATIVE — SIGNIFICANT CHANGE UP
LACTATE BLDV-MCNC: 2.3 MMOL/L — HIGH (ref 0.5–2)
LEUKOCYTE ESTERASE UR-ACNC: NEGATIVE — SIGNIFICANT CHANGE UP
LYMPHOCYTES # BLD AUTO: 0.86 K/UL — LOW (ref 1–3.3)
LYMPHOCYTES # BLD AUTO: 8.2 % — LOW (ref 13–44)
M PNEUMO DNA SPEC QL NAA+PROBE: NOT DETECTED — SIGNIFICANT CHANGE UP
MAGNESIUM SERPL-MCNC: 1.6 MG/DL — SIGNIFICANT CHANGE UP (ref 1.6–2.6)
MCHC RBC-ENTMCNC: 24.6 PG — LOW (ref 27–34)
MCHC RBC-ENTMCNC: 28.7 % — LOW (ref 32–36)
MCV RBC AUTO: 85.6 FL — SIGNIFICANT CHANGE UP (ref 80–100)
MONOCYTES # BLD AUTO: 1.14 K/UL — HIGH (ref 0–0.9)
MONOCYTES NFR BLD AUTO: 10.8 % — SIGNIFICANT CHANGE UP (ref 2–14)
MUCOUS THREADS # UR AUTO: SIGNIFICANT CHANGE UP
NEUTROPHILS # BLD AUTO: 8.09 K/UL — HIGH (ref 1.8–7.4)
NEUTROPHILS NFR BLD AUTO: 76.7 % — SIGNIFICANT CHANGE UP (ref 43–77)
NITRITE UR-MCNC: NEGATIVE — SIGNIFICANT CHANGE UP
NRBC # FLD: 0 — SIGNIFICANT CHANGE UP
PCO2 BLDCOV: 91 MMHG — HIGH (ref 27–49)
PCO2 BLDV: 100 MMHG — CRITICAL HIGH (ref 41–51)
PH BLDCOV: 7.29 PH — SIGNIFICANT CHANGE UP (ref 7.25–7.45)
PH BLDV: 7.27 PH — LOW (ref 7.32–7.43)
PH UR: 6 — SIGNIFICANT CHANGE UP (ref 4.6–8)
PHOSPHATE SERPL-MCNC: 3.8 MG/DL — SIGNIFICANT CHANGE UP (ref 2.5–4.5)
PLATELET # BLD AUTO: 408 K/UL — HIGH (ref 150–400)
PMV BLD: 9.8 FL — SIGNIFICANT CHANGE UP (ref 7–13)
PO2 BLDCOA: 107 MMHG — HIGH (ref 17–41)
PO2 BLDV: 50 MMHG — HIGH (ref 35–40)
POTASSIUM BLDV-SCNC: 4.3 MMOL/L — SIGNIFICANT CHANGE UP (ref 3.4–4.5)
POTASSIUM SERPL-MCNC: 4.1 MMOL/L — SIGNIFICANT CHANGE UP (ref 3.5–5.3)
POTASSIUM SERPL-SCNC: 4.1 MMOL/L — SIGNIFICANT CHANGE UP (ref 3.5–5.3)
PROT SERPL-MCNC: 7.5 G/DL — SIGNIFICANT CHANGE UP (ref 6–8.3)
PROT UR-MCNC: 30 — HIGH
PROTHROM AB SERPL-ACNC: 13.6 SEC — HIGH (ref 9.8–13.1)
RBC # BLD: 4.23 M/UL — SIGNIFICANT CHANGE UP (ref 3.8–5.2)
RBC # FLD: 21.9 % — HIGH (ref 10.3–14.5)
RBC CASTS # UR COMP ASSIST: SIGNIFICANT CHANGE UP (ref 0–?)
RSV RNA SPEC QL NAA+PROBE: NOT DETECTED — SIGNIFICANT CHANGE UP
RV+EV RNA SPEC QL NAA+PROBE: NOT DETECTED — SIGNIFICANT CHANGE UP
SAO2 % BLDV: 75.3 % — SIGNIFICANT CHANGE UP (ref 60–85)
SODIUM SERPL-SCNC: 145 MMOL/L — SIGNIFICANT CHANGE UP (ref 135–145)
SP GR SPEC: > 1.04 — HIGH (ref 1–1.03)
SQUAMOUS # UR AUTO: SIGNIFICANT CHANGE UP
TROPONIN T SERPL-MCNC: < 0.06 NG/ML — SIGNIFICANT CHANGE UP (ref 0–0.06)
UROBILINOGEN FLD QL: NORMAL E.U. — SIGNIFICANT CHANGE UP (ref 0.1–0.2)
WBC # BLD: 10.54 K/UL — HIGH (ref 3.8–10.5)
WBC # FLD AUTO: 10.54 K/UL — HIGH (ref 3.8–10.5)
WBC UR QL: SIGNIFICANT CHANGE UP (ref 0–?)

## 2017-11-11 PROCEDURE — 93010 ELECTROCARDIOGRAM REPORT: CPT

## 2017-11-11 PROCEDURE — 71275 CT ANGIOGRAPHY CHEST: CPT | Mod: 26

## 2017-11-11 PROCEDURE — 71010: CPT | Mod: 26

## 2017-11-11 PROCEDURE — 99291 CRITICAL CARE FIRST HOUR: CPT

## 2017-11-11 PROCEDURE — 99497 ADVNCD CARE PLAN 30 MIN: CPT | Mod: 25

## 2017-11-11 PROCEDURE — 99223 1ST HOSP IP/OBS HIGH 75: CPT

## 2017-11-11 RX ORDER — PIPERACILLIN AND TAZOBACTAM 4; .5 G/20ML; G/20ML
3.38 INJECTION, POWDER, LYOPHILIZED, FOR SOLUTION INTRAVENOUS EVERY 8 HOURS
Qty: 0 | Refills: 0 | Status: DISCONTINUED | OUTPATIENT
Start: 2017-11-11 | End: 2017-11-11

## 2017-11-11 RX ORDER — SENNA PLUS 8.6 MG/1
2 TABLET ORAL AT BEDTIME
Qty: 0 | Refills: 0 | Status: DISCONTINUED | OUTPATIENT
Start: 2017-11-11 | End: 2017-11-15

## 2017-11-11 RX ORDER — ACETAMINOPHEN 500 MG
650 TABLET ORAL ONCE
Qty: 0 | Refills: 0 | Status: COMPLETED | OUTPATIENT
Start: 2017-11-11 | End: 2017-11-11

## 2017-11-11 RX ORDER — ENOXAPARIN SODIUM 100 MG/ML
40 INJECTION SUBCUTANEOUS EVERY 24 HOURS
Qty: 0 | Refills: 0 | Status: DISCONTINUED | OUTPATIENT
Start: 2017-11-11 | End: 2017-11-15

## 2017-11-11 RX ORDER — IPRATROPIUM/ALBUTEROL SULFATE 18-103MCG
3 AEROSOL WITH ADAPTER (GRAM) INHALATION ONCE
Qty: 0 | Refills: 0 | Status: COMPLETED | OUTPATIENT
Start: 2017-11-11 | End: 2017-11-11

## 2017-11-11 RX ORDER — SODIUM CHLORIDE 9 MG/ML
500 INJECTION INTRAMUSCULAR; INTRAVENOUS; SUBCUTANEOUS ONCE
Qty: 0 | Refills: 0 | Status: COMPLETED | OUTPATIENT
Start: 2017-11-11 | End: 2017-11-11

## 2017-11-11 RX ORDER — IPRATROPIUM BROMIDE 0.2 MG/ML
500 SOLUTION, NON-ORAL INHALATION EVERY 6 HOURS
Qty: 0 | Refills: 0 | Status: DISCONTINUED | OUTPATIENT
Start: 2017-11-11 | End: 2017-11-15

## 2017-11-11 RX ORDER — AZITHROMYCIN 500 MG/1
500 TABLET, FILM COATED ORAL ONCE
Qty: 0 | Refills: 0 | Status: DISCONTINUED | OUTPATIENT
Start: 2017-11-11 | End: 2017-11-11

## 2017-11-11 RX ORDER — FUROSEMIDE 40 MG
1 TABLET ORAL
Qty: 0 | Refills: 0 | COMMUNITY

## 2017-11-11 RX ORDER — ACETAMINOPHEN 500 MG
650 TABLET ORAL EVERY 6 HOURS
Qty: 0 | Refills: 0 | Status: DISCONTINUED | OUTPATIENT
Start: 2017-11-11 | End: 2017-11-15

## 2017-11-11 RX ORDER — PIPERACILLIN AND TAZOBACTAM 4; .5 G/20ML; G/20ML
3.38 INJECTION, POWDER, LYOPHILIZED, FOR SOLUTION INTRAVENOUS ONCE
Qty: 0 | Refills: 0 | Status: DISCONTINUED | OUTPATIENT
Start: 2017-11-11 | End: 2017-11-11

## 2017-11-11 RX ORDER — ALBUTEROL 90 UG/1
2 AEROSOL, METERED ORAL
Qty: 0 | Refills: 0 | COMMUNITY

## 2017-11-11 RX ORDER — VANCOMYCIN HCL 1 G
1000 VIAL (EA) INTRAVENOUS ONCE
Qty: 0 | Refills: 0 | Status: DISCONTINUED | OUTPATIENT
Start: 2017-11-11 | End: 2017-11-11

## 2017-11-11 RX ORDER — ASPIRIN/CALCIUM CARB/MAGNESIUM 324 MG
81 TABLET ORAL DAILY
Qty: 0 | Refills: 0 | Status: DISCONTINUED | OUTPATIENT
Start: 2017-11-11 | End: 2017-11-15

## 2017-11-11 RX ORDER — VANCOMYCIN HCL 1 G
VIAL (EA) INTRAVENOUS
Qty: 0 | Refills: 0 | Status: DISCONTINUED | OUTPATIENT
Start: 2017-11-11 | End: 2017-11-11

## 2017-11-11 RX ORDER — ASPIRIN/CALCIUM CARB/MAGNESIUM 324 MG
1 TABLET ORAL
Qty: 0 | Refills: 0 | COMMUNITY

## 2017-11-11 RX ORDER — AZITHROMYCIN 500 MG/1
TABLET, FILM COATED ORAL
Qty: 0 | Refills: 0 | Status: DISCONTINUED | OUTPATIENT
Start: 2017-11-11 | End: 2017-11-11

## 2017-11-11 RX ORDER — INFLUENZA VIRUS VACCINE 15; 15; 15; 15 UG/.5ML; UG/.5ML; UG/.5ML; UG/.5ML
0.5 SUSPENSION INTRAMUSCULAR ONCE
Qty: 0 | Refills: 0 | Status: COMPLETED | OUTPATIENT
Start: 2017-11-11 | End: 2017-11-11

## 2017-11-11 RX ORDER — ALBUTEROL 90 UG/1
2 AEROSOL, METERED ORAL EVERY 6 HOURS
Qty: 0 | Refills: 0 | Status: DISCONTINUED | OUTPATIENT
Start: 2017-11-11 | End: 2017-11-11

## 2017-11-11 RX ORDER — FUROSEMIDE 40 MG
20 TABLET ORAL DAILY
Qty: 0 | Refills: 0 | Status: DISCONTINUED | OUTPATIENT
Start: 2017-11-11 | End: 2017-11-12

## 2017-11-11 RX ADMIN — Medication 20 MILLIGRAM(S): at 13:31

## 2017-11-11 RX ADMIN — Medication 125 MILLIGRAM(S): at 05:17

## 2017-11-11 RX ADMIN — Medication 500 MICROGRAM(S): at 15:27

## 2017-11-11 RX ADMIN — SODIUM CHLORIDE 500 MILLILITER(S): 9 INJECTION INTRAMUSCULAR; INTRAVENOUS; SUBCUTANEOUS at 13:30

## 2017-11-11 RX ADMIN — Medication 81 MILLIGRAM(S): at 13:31

## 2017-11-11 RX ADMIN — Medication 3 MILLILITER(S): at 05:51

## 2017-11-11 RX ADMIN — Medication 3 MILLILITER(S): at 05:08

## 2017-11-11 RX ADMIN — Medication 3 MILLILITER(S): at 05:17

## 2017-11-11 RX ADMIN — Medication 500 MICROGRAM(S): at 23:07

## 2017-11-11 NOTE — H&P ADULT - PROBLEM SELECTOR PLAN 8
with chronic bronchitis in setting of lung malignancy  Ventolin HFA PRN  Atrovent q6 ATC given tachycardia

## 2017-11-11 NOTE — H&P ADULT - PROBLEM SELECTOR PLAN 3
Explained CPR and intubation to patient  Reports she would like to be kept alive as long as possible and would want resuscitation if needed  Full code, will likely need to be readdressed this admission.   Advanced care planning time: 30 min  Consider palliative consult  HCP: daughter: Tasha Jason: [phone]

## 2017-11-11 NOTE — H&P ADULT - ASSESSMENT
57 female with Stage 4 metastatic colon adenocarcinoma with mets to lungs and liver on chronic home O2 and chemotherapy presents with worsening shortness of breath and chest tightness. Admitted with acute on chronic hypercapnic and hypoxic respiratory failure.

## 2017-11-11 NOTE — H&P ADULT - PROBLEM SELECTOR PLAN 5
limited left leg US in ED negative for DVT. Check official US leg b/l  Check TTE  Continue lasix 20mg

## 2017-11-11 NOTE — ED PROCEDURE NOTE - PROCEDURE ADDITIONAL DETAILS
Limited Left leg DVT study shows no proximal DVT.  See images and report in chart. Please repeat in 5-7 days.  K[place] [phone]

## 2017-11-11 NOTE — H&P ADULT - NSHPLABSRESULTS_GEN_ALL_CORE
CT chest personally reviewed with pulmonary fellow: diffuse lung metastases, no PE  EKG personally reviewed by me: sinus tachycardia @135 with artifact, no acute ST-T changes appreciated

## 2017-11-11 NOTE — H&P ADULT - PROBLEM SELECTOR PLAN 2
Mets to lungs and liver  Onc consulted via email  Per patient plan to start new regimen on Mon 11/13, follows at [facility]  Last chemo on 9/22

## 2017-11-11 NOTE — ED PROVIDER NOTE - PROGRESS NOTE DETAILS
micu rejected, pt. on bipap for co2 100, aaox3, discussed GOC, pt would wish to be intubated and have cpr preformed if needed

## 2017-11-11 NOTE — ED ADULT NURSE NOTE - CHIEF COMPLAINT QUOTE
Pt arrives on 100% NRB mask..pt st" Since Wednesday I been feeling short of breath and the tonight having chest pain" As per EMT" Pox on 3lnc was 82%" Hx of colon ca with mets. Last Chemo Sept

## 2017-11-11 NOTE — H&P ADULT - PROBLEM SELECTOR PLAN 1
likely due to worsening lung metastases with poor lung reserve volume  Sinus tachycardia likely due to underlying lung disease burden. Will check TTE  Presently on bipap  multiple unsuccessful attempts at ABG. Will repeat VBG  CTA chest negative for PE and with diffuse lung mets  Continue chronic prednisone 5mg, ASA 81mg  Discussed with pulmonary fellow at bedside

## 2017-11-11 NOTE — H&P ADULT - PROBLEM SELECTOR PLAN 6
likely due to malignant ascites  continue lasix  Call procedure team for diagnostic and therapeutic paracentesis

## 2017-11-11 NOTE — H&P ADULT - HISTORY OF PRESENT ILLNESS
58 yo F [facility] metastatic colon adenocarcinoma to liver and lungs was treated initially with FOLFOX and Bevacizumab but changed to Irinotecan and Panitumumab given progression of disease presents with worsening shortness of breath and chest tightness. Last chemo received on 9/22/17 and follows at [facility] and is due to start a new treatment on Mon 11/13 per patient (no radiation or surgeries for cancer in past).  At baseline uses 2 liters O2 NC at rest and with exertion uses 5 liters and can walk up to a block but recently short of breath with minimal exertion and not helped with inhalers.     In ED placed on bipap for increased work of breathing and hypercapnea and feels improvement. Chest tightness also improved. Also reports increased firmeness and distention of abdomen with some discomfort. Denies nausea and vomiting. Has had a dry cough since April 2017 which has progressed to productive over past 1 month alternating between clear and yellowish green phlegm. Also reports she is completing antibiotics for a recent UTI  and has 3 more days left to complete a 7 day course. No fevers, chills, sick contacts.    Vital Signs Last 24 Hrs  T(C): 36.4 (11 Nov 2017 08:17), Max: 36.6 (11 Nov 2017 04:35)  T(F): 97.5 (11 Nov 2017 08:17), Max: 97.9 (11 Nov 2017 04:35)  HR: 127 (11 Nov 2017 08:17) (125 - 131)  BP: 145/103 (11 Nov 2017 08:17) (145/103 - 181/117)  BP(mean): --  RR: 22 (11 Nov 2017 08:17) (15 - 32)  SpO2: 100% (11 Nov 2017 08:17) (99% - 100%)

## 2017-11-11 NOTE — H&P ADULT - PROBLEM SELECTOR PLAN 9
per patient has 3 more days to complete a 7 day course of levaquin for UTI  Prior Urine culture from 10/31 with Ecoli resistant to levaquin, cipro, and ampicillin  recheck UA, Ucx, hold off further antibiotics for now

## 2017-11-11 NOTE — H&P ADULT - NSHPPHYSICALEXAM_GEN_ALL_CORE
PHYSICAL EXAM:  GENERAL: appears slightly tachypneic but comfortable on bipap  HEENT: atraumatic, normocephalic, sclera clear b/l  CHEST/LUNG: diffuse fine crackles b/l lung fields right>left   HEART: S1/S2, tachycardic, unable to appreciate murmurs  ABDOMEN: epigastric firmness, nontender, decreased bowel sounds, distended  EXTREMITIES:  2+ Peripheral Pulses, No clubbing, cyanosis. + mild peripheral edema b/l  PSYCH: normal affect, calm demeanor  NEUROLOGY: AAOX3, no gross neuro deficits  SKIN: No rashes or lesions

## 2017-11-11 NOTE — ED PROVIDER NOTE - ATTENDING CONTRIBUTION TO CARE
[place]: 57 yof with metastatic colon cancer to lungs and liver chemo to be restarted in 2 days.  Pt came to ED for sob, started few days ago and worsening, no chest pain, no leg pain, +leg swelling chronic, uses home o2 but EMS states home o2 with 82% pulse ox, no fever, increased white sputum with cough, chronic abd pain, no nausea, no fever.  On exam, pt is in resp distress, +retractions, leaning forward, diffuse wheeze with crackles, 94% on 100NRB, afebrile, tachy 132, abd firm upper abd with mild tn, enlarged liver, left leg pitting edema more than right, skin normal.  Pt has some improvement with nebs and steroids and pco2 high, bipap trial now.  ICU called, CT shows no pe.

## 2017-11-11 NOTE — ED PROVIDER NOTE - MEDICAL DECISION MAKING DETAILS
57yoF h/o metastatic colon cancer, anemia, asthma , cad p/w Sob and cough x2 days r/o PE vs asthma exacerbation. tachycardic w/ LLE edema w/ chest pain, in setting of maligancy, CTa, cbc, cmp, pt/inr, trop, ekg, cxr, us lle , tylenol, 57yoF h/o metastatic colon cancer, anemia, asthma , cad p/w Sob and cough x2 days r/o PE vs asthma exacerbation. tachycardic w/ LLE edema w/ chest pain, in setting of malignancy, CTa, cbc, cmp, pt/inr, trop, ekg, cxr, us lle , tylenol, duoneb, solumedrol, oxygen ,

## 2017-11-11 NOTE — ED PROVIDER NOTE - OBJECTIVE STATEMENT
57yoF h/o metastatic colon cancer, anemia, asthma , cad p/w Sob and cough x2 days. Pt brought in by EMS sat on 3lnc was 82% placed on NRB. Pt states he is on oxygen 2L at home 5L w/ walking but has been more SOB over last 3 days. She states she increased her oxygen but was still SOB. Last chemo was in September , she reports being on prednisone for last few weeks for her breathing , reports swelling in her LLE during this time. She was seen in the ED for abdominal pain in late October. Denies fever, chills, has had nonproductive cough for several months. States she currently has chest pain on her left chest worse with breathing/ coughing.

## 2017-11-11 NOTE — CONSULT NOTE ADULT - SUBJECTIVE AND OBJECTIVE BOX
CC/HPI:    58 y/o F hx metastatic colon ca with lesions in liver and lung, CAD, asthma presents with complaints of chest pain and shortness of breath.     Patient reports two day history of progressive shortness of breath and non-productive cough. Describes substernal non-exertional chest pain without radiation. Denies specific alleviating factors. Pain is worsened with exertion. She denies fevers/chills or recent illness/sick contacts. This presentation is not reminiscent of previous asthma exacerbations. She last had chemotherapy in late September. The patient typically uses 2L home oxygen, though recently she has required up to 5L with movement. She is a never smoker. Denies history of sleep apnea or obstruction. She endorses swelling in both lower extremities, though she is unsure of duration. Left lower extremity is more swollen than right.     Allergies    No Known Allergies    Intolerances        MEDICATIONS  (STANDING):    MEDICATIONS  (PRN):      PAST MEDICAL & SURGICAL HISTORY:  Colon cancer metastasized to multiple sites: liver and lung  Asthma  Anemia  History of 3  sections  H/O coronary angiogram:       FAMILY HISTORY:  No pertinent family history in first degree relatives      SOCIAL HISTORY: No EtOH, no tobacco    REVIEW OF SYSTEMS:    CONSTITUTIONAL: Endorses weakness  EYES/ENT: No visual changes;  No vertigo or throat pain   NECK: No pain or stiffness  RESPIRATORY: No cough, wheezing, hemoptysis; Endorses shortness of breath  CARDIOVASCULAR: No chest pain or palpitations  GASTROINTESTINAL: No abdominal or epigastric pain. No nausea, vomiting, or hematemesis; No diarrhea or constipation. No melena or hematochezia.  GENITOURINARY: No dysuria, frequency or hematuria  NEUROLOGICAL: No numbness or weakness  SKIN: No itching, burning, rashes, or lesions   All other review of systems is negative unless indicated above.        T(F): 97.9 (17 @ 04:35), Max: 97.9 (17 @ 04:35)  HR: 131 (17 @ 04:35)  BP: 181/117 (17 @ 04:35)  RR: 32 (17 @ 04:35)  SpO2: 100% (17 @ 04:35)  Wt(kg): --    GENERAL: Thin, chronically ill appearing   HEAD:  Atraumatic, Normocephalic  EYES: EOMI, PERRLA, conjunctiva and sclera clear  NECK: Supple, No JVD  CHEST/LUNG: Coarse breath sounds bilaterally  HEART: Regular rate and rhythm; No murmurs, rubs, or gallops  ABDOMEN: Soft, Nontender, Nondistended; Bowel sounds present  EXTREMITIES:  2+ Peripheral Pulses, No clubbing, cyanosis, or edema  NEUROLOGY: non-focal  SKIN: No rashes or lesions                          10.4   10.54 )-----------( 408      ( 2017 05:03 )             36.2           145  |  97<L>  |  9   ----------------------------<  96  4.1   |  36<H>  |  0.50    Ca    9.0      2017 05:03  Phos  3.8       Mg     1.6         TPro  7.5  /  Alb  2.8<L>  /  TBili  0.5  /  DBili  x   /  AST  74<H>  /  ALT  26  /  AlkPhos  314<H>        Phosphorus Level, Serum: 3.8 mg/dL ( @ 05:03)  Magnesium, Serum: 1.6 mg/dL ( @ 05:03)      CTA: LUNGS AND LARGE AIRWAYS: Patent central airways. Bilateral diffuse   extensive confluent metastasis without significant interval change from   10/31/2017. No pulmonary embolism.

## 2017-11-11 NOTE — ED PROVIDER NOTE - CARE PLAN
Principal Discharge DX:	Hypercapnic acidosis Principal Discharge DX:	Hypercapnic acidosis  Instructions for follow-up, activity and diet:	Bipap

## 2017-11-12 DIAGNOSIS — R06.02 SHORTNESS OF BREATH: ICD-10-CM

## 2017-11-12 LAB
ALBUMIN SERPL ELPH-MCNC: 2.9 G/DL — LOW (ref 3.3–5)
ALP SERPL-CCNC: 284 U/L — HIGH (ref 40–120)
ALT FLD-CCNC: 25 U/L — SIGNIFICANT CHANGE UP (ref 4–33)
AST SERPL-CCNC: 55 U/L — HIGH (ref 4–32)
BASE EXCESS BLDV CALC-SCNC: 16.9 MMOL/L — SIGNIFICANT CHANGE UP
BASOPHILS # BLD AUTO: 0 K/UL — SIGNIFICANT CHANGE UP (ref 0–0.2)
BASOPHILS NFR BLD AUTO: 0 % — SIGNIFICANT CHANGE UP (ref 0–2)
BILIRUB SERPL-MCNC: 0.4 MG/DL — SIGNIFICANT CHANGE UP (ref 0.2–1.2)
BUN SERPL-MCNC: 11 MG/DL — SIGNIFICANT CHANGE UP (ref 7–23)
BUN SERPL-MCNC: 11 MG/DL — SIGNIFICANT CHANGE UP (ref 7–23)
CALCIUM SERPL-MCNC: 9.3 MG/DL — SIGNIFICANT CHANGE UP (ref 8.4–10.5)
CALCIUM SERPL-MCNC: 9.3 MG/DL — SIGNIFICANT CHANGE UP (ref 8.4–10.5)
CHLORIDE SERPL-SCNC: 100 MMOL/L — SIGNIFICANT CHANGE UP (ref 98–107)
CHLORIDE SERPL-SCNC: 100 MMOL/L — SIGNIFICANT CHANGE UP (ref 98–107)
CO2 SERPL-SCNC: 43 MMOL/L — HIGH (ref 22–31)
CO2 SERPL-SCNC: 43 MMOL/L — HIGH (ref 22–31)
CREAT SERPL-MCNC: 0.46 MG/DL — LOW (ref 0.5–1.3)
CREAT SERPL-MCNC: 0.46 MG/DL — LOW (ref 0.5–1.3)
EOSINOPHIL # BLD AUTO: 0 K/UL — SIGNIFICANT CHANGE UP (ref 0–0.5)
EOSINOPHIL NFR BLD AUTO: 0 % — SIGNIFICANT CHANGE UP (ref 0–6)
GAS PNL BLDV: 139 MMOL/L — SIGNIFICANT CHANGE UP (ref 136–146)
GLUCOSE BLDC GLUCOMTR-MCNC: 147 MG/DL — HIGH (ref 70–99)
GLUCOSE BLDV-MCNC: 109 — HIGH (ref 70–99)
GLUCOSE SERPL-MCNC: 113 MG/DL — HIGH (ref 70–99)
GLUCOSE SERPL-MCNC: 113 MG/DL — HIGH (ref 70–99)
HCO3 BLDV-SCNC: 39 MMOL/L — HIGH (ref 20–27)
HCT VFR BLD CALC: 34 % — LOW (ref 34.5–45)
HCT VFR BLD CALC: 34 % — LOW (ref 34.5–45)
HCT VFR BLDV CALC: 31.8 % — LOW (ref 34.5–45)
HGB BLD-MCNC: 9.7 G/DL — LOW (ref 11.5–15.5)
HGB BLD-MCNC: 9.7 G/DL — LOW (ref 11.5–15.5)
HGB BLDV-MCNC: 10.3 G/DL — LOW (ref 11.5–15.5)
IMM GRANULOCYTES # BLD AUTO: 0.04 # — SIGNIFICANT CHANGE UP
IMM GRANULOCYTES NFR BLD AUTO: 0.4 % — SIGNIFICANT CHANGE UP (ref 0–1.5)
LYMPHOCYTES # BLD AUTO: 0.54 K/UL — LOW (ref 1–3.3)
LYMPHOCYTES # BLD AUTO: 5.6 % — LOW (ref 13–44)
MAGNESIUM SERPL-MCNC: 1.7 MG/DL — SIGNIFICANT CHANGE UP (ref 1.6–2.6)
MCHC RBC-ENTMCNC: 24.7 PG — LOW (ref 27–34)
MCHC RBC-ENTMCNC: 24.7 PG — LOW (ref 27–34)
MCHC RBC-ENTMCNC: 28.5 % — LOW (ref 32–36)
MCHC RBC-ENTMCNC: 28.5 % — LOW (ref 32–36)
MCV RBC AUTO: 86.5 FL — SIGNIFICANT CHANGE UP (ref 80–100)
MCV RBC AUTO: 86.5 FL — SIGNIFICANT CHANGE UP (ref 80–100)
MONOCYTES # BLD AUTO: 1.13 K/UL — HIGH (ref 0–0.9)
MONOCYTES NFR BLD AUTO: 11.6 % — SIGNIFICANT CHANGE UP (ref 2–14)
NEUTROPHILS # BLD AUTO: 7.99 K/UL — HIGH (ref 1.8–7.4)
NEUTROPHILS NFR BLD AUTO: 82.4 % — HIGH (ref 43–77)
NRBC # FLD: 0 — SIGNIFICANT CHANGE UP
NRBC # FLD: 0 — SIGNIFICANT CHANGE UP
PCO2 BLDV: 77 MMHG — HIGH (ref 41–51)
PH BLDV: 7.37 PH — SIGNIFICANT CHANGE UP (ref 7.32–7.43)
PHOSPHATE SERPL-MCNC: 3.8 MG/DL — SIGNIFICANT CHANGE UP (ref 2.5–4.5)
PLATELET # BLD AUTO: 341 K/UL — SIGNIFICANT CHANGE UP (ref 150–400)
PLATELET # BLD AUTO: 341 K/UL — SIGNIFICANT CHANGE UP (ref 150–400)
PMV BLD: 9.7 FL — SIGNIFICANT CHANGE UP (ref 7–13)
PMV BLD: 9.7 FL — SIGNIFICANT CHANGE UP (ref 7–13)
PO2 BLDV: 182 MMHG — HIGH (ref 35–40)
POTASSIUM BLDV-SCNC: 4 MMOL/L — SIGNIFICANT CHANGE UP (ref 3.4–4.5)
POTASSIUM SERPL-MCNC: 4.5 MMOL/L — SIGNIFICANT CHANGE UP (ref 3.5–5.3)
POTASSIUM SERPL-MCNC: 4.5 MMOL/L — SIGNIFICANT CHANGE UP (ref 3.5–5.3)
POTASSIUM SERPL-SCNC: 4.5 MMOL/L — SIGNIFICANT CHANGE UP (ref 3.5–5.3)
POTASSIUM SERPL-SCNC: 4.5 MMOL/L — SIGNIFICANT CHANGE UP (ref 3.5–5.3)
PROT SERPL-MCNC: 7.2 G/DL — SIGNIFICANT CHANGE UP (ref 6–8.3)
RBC # BLD: 3.93 M/UL — SIGNIFICANT CHANGE UP (ref 3.8–5.2)
RBC # BLD: 3.93 M/UL — SIGNIFICANT CHANGE UP (ref 3.8–5.2)
RBC # FLD: 21.7 % — HIGH (ref 10.3–14.5)
RBC # FLD: 21.7 % — HIGH (ref 10.3–14.5)
SAO2 % BLDV: 99.6 % — HIGH (ref 60–85)
SODIUM SERPL-SCNC: 146 MMOL/L — HIGH (ref 135–145)
SODIUM SERPL-SCNC: 146 MMOL/L — HIGH (ref 135–145)
SPECIMEN SOURCE: SIGNIFICANT CHANGE UP
SPECIMEN SOURCE: SIGNIFICANT CHANGE UP
WBC # BLD: 9.7 K/UL — SIGNIFICANT CHANGE UP (ref 3.8–10.5)
WBC # BLD: 9.7 K/UL — SIGNIFICANT CHANGE UP (ref 3.8–10.5)
WBC # FLD AUTO: 9.7 K/UL — SIGNIFICANT CHANGE UP (ref 3.8–10.5)
WBC # FLD AUTO: 9.7 K/UL — SIGNIFICANT CHANGE UP (ref 3.8–10.5)

## 2017-11-12 PROCEDURE — 99233 SBSQ HOSP IP/OBS HIGH 50: CPT | Mod: GC

## 2017-11-12 PROCEDURE — 99255 IP/OBS CONSLTJ NEW/EST HI 80: CPT | Mod: GC

## 2017-11-12 RX ORDER — SODIUM CHLORIDE 9 MG/ML
500 INJECTION INTRAMUSCULAR; INTRAVENOUS; SUBCUTANEOUS ONCE
Qty: 0 | Refills: 0 | Status: COMPLETED | OUTPATIENT
Start: 2017-11-12 | End: 2017-11-12

## 2017-11-12 RX ORDER — DEXAMETHASONE 0.5 MG/5ML
8 ELIXIR ORAL EVERY 12 HOURS
Qty: 0 | Refills: 0 | Status: DISCONTINUED | OUTPATIENT
Start: 2017-11-12 | End: 2017-11-12

## 2017-11-12 RX ADMIN — Medication 101.6 MILLIGRAM(S): at 14:54

## 2017-11-12 RX ADMIN — Medication 500 MICROGRAM(S): at 10:38

## 2017-11-12 RX ADMIN — Medication 500 MICROGRAM(S): at 03:39

## 2017-11-12 RX ADMIN — SODIUM CHLORIDE 500 MILLILITER(S): 9 INJECTION INTRAMUSCULAR; INTRAVENOUS; SUBCUTANEOUS at 13:44

## 2017-11-12 RX ADMIN — Medication 20 MILLIGRAM(S): at 05:29

## 2017-11-12 RX ADMIN — Medication 81 MILLIGRAM(S): at 13:02

## 2017-11-12 NOTE — PROGRESS NOTE ADULT - PROBLEM SELECTOR PLAN 1
likely due to worsening lung metastases with poor lung reserve volume  Sinus tachycardia likely due to underlying lung disease burden. Will check TTE  Bipap alternating with nasal cannula as tolerated  CTA chest negative for PE and with diffuse lung mets  Continue chronic prednisone 5mg, ASA 81mg  Ventolin HFA PRN  Atrovent q6 ATC given tachycardia

## 2017-11-12 NOTE — PROGRESS NOTE ADULT - SUBJECTIVE AND OBJECTIVE BOX
CHIEF COMPLAINT:    Interval Events:    REVIEW OF SYSTEMS:  Constitutional:   Eyes:  ENT:  CV:  Resp:  GI:  :  MSK:  Integumentary:  Neurological:  Psychiatric:  Endocrine:  Hematologic/Lymphatic:  Allergic/Immunologic:  [ ] All other systems negative  [ ] Unable to assess ROS because ________    OBJECTIVE:  ICU Vital Signs Last 24 Hrs  T(C): 36.3 (12 Nov 2017 05:40), Max: 37.4 (11 Nov 2017 13:34)  T(F): 97.4 (12 Nov 2017 05:40), Max: 99.3 (11 Nov 2017 13:34)  HR: 112 (12 Nov 2017 06:30) (110 - 125)  BP: 143/100 (12 Nov 2017 05:40) (143/100 - 156/68)  BP(mean): --  ABP: --  ABP(mean): --  RR: 20 (12 Nov 2017 05:40) (19 - 20)  SpO2: 98% (12 Nov 2017 06:30) (94% - 100%)        CAPILLARY BLOOD GLUCOSE          PHYSICAL EXAM:  General:   HEENT:   Lymph Nodes:  Neck:   Respiratory:   Cardiovascular:   Abdomen:   Extremities:   Skin:   Neurological:  Psychiatry:    HOSPITAL MEDICATIONS:  MEDICATIONS  (STANDING):  aspirin  chewable 81 milliGRAM(s) Oral daily  enoxaparin Injectable 40 milliGRAM(s) SubCutaneous every 24 hours  furosemide    Tablet 20 milliGRAM(s) Oral daily  influenza   Vaccine 0.5 milliLiter(s) IntraMuscular once  ipratropium    for Nebulization 500 MICROGram(s) Nebulizer every 6 hours  predniSONE   Tablet 5 milliGRAM(s) Oral daily    MEDICATIONS  (PRN):  acetaminophen   Tablet. 650 milliGRAM(s) Oral every 6 hours PRN Moderate Pain (4 - 6)  senna 2 Tablet(s) Oral at bedtime PRN Constipation      LABS:                        9.7    9.70  )-----------( 341      ( 12 Nov 2017 05:30 )             34.0     11-12    146<H>  |  100  |  11  ----------------------------<  113<H>  4.5   |  43<H>  |  0.46<L>    Ca    9.3      12 Nov 2017 05:30  Phos  3.8     11-12  Mg     1.7     11-12    TPro  7.2  /  Alb  2.9<L>  /  TBili  0.4  /  DBili  x   /  AST  55<H>  /  ALT  25  /  AlkPhos  284<H>  11-12    PT/INR - ( 11 Nov 2017 05:10 )   PT: 13.6 SEC;   INR: 1.21          PTT - ( 11 Nov 2017 05:10 )  PTT:27.7 SEC  Urinalysis Basic - ( 11 Nov 2017 14:49 )    Color: YELLOW / Appearance: CLEAR / SG: > 1.040 / pH: 6.0  Gluc: NEGATIVE / Ketone: NEGATIVE  / Bili: NEGATIVE / Urobili: NORMAL E.U.   Blood: NEGATIVE / Protein: 30 / Nitrite: NEGATIVE   Leuk Esterase: NEGATIVE / RBC: 0-2 / WBC 0-2   Sq Epi: FEW / Non Sq Epi: x / Bacteria: x        Venous Blood Gas:  11-12 @ 06:15  7.37/77/182/39/99.6  VBG Lactate: --  Venous Blood Gas:  11-11 @ 05:03  7.27/100/50/37/75.3  VBG Lactate: 2.3      MICROBIOLOGY:     RADIOLOGY:  [ ] Reviewed and interpreted by me    PULMONARY FUNCTION TESTS:    EKG: CHIEF COMPLAINT: Patient is a 57y old  Female who presents with a chief complaint of chest tightness, worsening SOB (11 Nov 2017 10:01)    Interval Events: Hypertensive    REVIEW OF SYSTEMS:  Constitutional:   Eyes:  ENT:  CV:  Resp:  GI:  :  MSK:  Integumentary:  Neurological:  Psychiatric:  Endocrine:  Hematologic/Lymphatic:  Allergic/Immunologic:  [ ] All other systems negative  [ ] Unable to assess ROS because ________    OBJECTIVE:  ICU Vital Signs Last 24 Hrs  T(C): 36.3 (12 Nov 2017 05:40), Max: 37.4 (11 Nov 2017 13:34)  T(F): 97.4 (12 Nov 2017 05:40), Max: 99.3 (11 Nov 2017 13:34)  HR: 112 (12 Nov 2017 06:30) (110 - 125)  BP: 143/100 (12 Nov 2017 05:40) (143/100 - 156/68)  BP(mean): --  ABP: --  ABP(mean): --  RR: 20 (12 Nov 2017 05:40) (19 - 20)  SpO2: 98% (12 Nov 2017 06:30) (94% - 100%)        CAPILLARY BLOOD GLUCOSE      HOSPITAL MEDICATIONS:  MEDICATIONS  (STANDING):  aspirin  chewable 81 milliGRAM(s) Oral daily  enoxaparin Injectable 40 milliGRAM(s) SubCutaneous every 24 hours  furosemide    Tablet 20 milliGRAM(s) Oral daily  influenza   Vaccine 0.5 milliLiter(s) IntraMuscular once  ipratropium    for Nebulization 500 MICROGram(s) Nebulizer every 6 hours  predniSONE   Tablet 5 milliGRAM(s) Oral daily    MEDICATIONS  (PRN):  acetaminophen   Tablet. 650 milliGRAM(s) Oral every 6 hours PRN Moderate Pain (4 - 6)  senna 2 Tablet(s) Oral at bedtime PRN Constipation      LABS:                        9.7    9.70  )-----------( 341      ( 12 Nov 2017 05:30 )             34.0     11-12    146<H>  |  100  |  11  ----------------------------<  113<H>  4.5   |  43<H>  |  0.46<L>    Ca    9.3      12 Nov 2017 05:30  Phos  3.8     11-12  Mg     1.7     11-12    TPro  7.2  /  Alb  2.9<L>  /  TBili  0.4  /  DBili  x   /  AST  55<H>  /  ALT  25  /  AlkPhos  284<H>  11-12    PT/INR - ( 11 Nov 2017 05:10 )   PT: 13.6 SEC;   INR: 1.21          PTT - ( 11 Nov 2017 05:10 )  PTT:27.7 SEC  Urinalysis Basic - ( 11 Nov 2017 14:49 )    Color: YELLOW / Appearance: CLEAR / SG: > 1.040 / pH: 6.0  Gluc: NEGATIVE / Ketone: NEGATIVE  / Bili: NEGATIVE / Urobili: NORMAL E.U.   Blood: NEGATIVE / Protein: 30 / Nitrite: NEGATIVE   Leuk Esterase: NEGATIVE / RBC: 0-2 / WBC 0-2   Sq Epi: FEW / Non Sq Epi: x / Bacteria: x        Venous Blood Gas:  11-12 @ 06:15  7.37/77/182/39/99.6  VBG Lactate: --  Venous Blood Gas:  11-11 @ 05:03  7.27/100/50/37/75.3  VBG Lactate: 2.3      MICROBIOLOGY:     RADIOLOGY:  [ ] Reviewed and interpreted by me    PULMONARY FUNCTION TESTS:    EKG: CHIEF COMPLAINT: Patient is a 57y old  Female who presents with a chief complaint of chest tightness, worsening SOB (11 Nov 2017 10:01)    Interval Events: Hypertensive    REVIEW OF SYSTEMS:  Constitutional: Feeling a bit better  Eyes:  ENT:  CV: Denies CP  Resp: c/o difficulty breathing overnight  GI:  :  MSK:  Integumentary:  Neurological:  Psychiatric:  Endocrine:  Hematologic/Lymphatic:  Allergic/Immunologic:  [x] All other systems negative  [ ] Unable to assess ROS because ________    OBJECTIVE:  ICU Vital Signs Last 24 Hrs  T(C): 36.3 (12 Nov 2017 05:40), Max: 37.4 (11 Nov 2017 13:34)  T(F): 97.4 (12 Nov 2017 05:40), Max: 99.3 (11 Nov 2017 13:34)  HR: 112 (12 Nov 2017 06:30) (110 - 125)  BP: 143/100 (12 Nov 2017 05:40) (143/100 - 156/68)  BP(mean): --  ABP: --  ABP(mean): --  RR: 20 (12 Nov 2017 05:40) (19 - 20)  SpO2: 98% (12 Nov 2017 06:30) (94% - 100%)        CAPILLARY BLOOD GLUCOSE      HOSPITAL MEDICATIONS:  MEDICATIONS  (STANDING):  aspirin  chewable 81 milliGRAM(s) Oral daily  enoxaparin Injectable 40 milliGRAM(s) SubCutaneous every 24 hours  furosemide    Tablet 20 milliGRAM(s) Oral daily  influenza   Vaccine 0.5 milliLiter(s) IntraMuscular once  ipratropium    for Nebulization 500 MICROGram(s) Nebulizer every 6 hours  predniSONE   Tablet 5 milliGRAM(s) Oral daily    MEDICATIONS  (PRN):  acetaminophen   Tablet. 650 milliGRAM(s) Oral every 6 hours PRN Moderate Pain (4 - 6)  senna 2 Tablet(s) Oral at bedtime PRN Constipation      LABS:                        9.7    9.70  )-----------( 341      ( 12 Nov 2017 05:30 )             34.0     11-12    146<H>  |  100  |  11  ----------------------------<  113<H>  4.5   |  43<H>  |  0.46<L>    Ca    9.3      12 Nov 2017 05:30  Phos  3.8     11-12  Mg     1.7     11-12    TPro  7.2  /  Alb  2.9<L>  /  TBili  0.4  /  DBili  x   /  AST  55<H>  /  ALT  25  /  AlkPhos  284<H>  11-12    PT/INR - ( 11 Nov 2017 05:10 )   PT: 13.6 SEC;   INR: 1.21          PTT - ( 11 Nov 2017 05:10 )  PTT:27.7 SEC  Urinalysis Basic - ( 11 Nov 2017 14:49 )    Color: YELLOW / Appearance: CLEAR / SG: > 1.040 / pH: 6.0  Gluc: NEGATIVE / Ketone: NEGATIVE  / Bili: NEGATIVE / Urobili: NORMAL E.U.   Blood: NEGATIVE / Protein: 30 / Nitrite: NEGATIVE   Leuk Esterase: NEGATIVE / RBC: 0-2 / WBC 0-2   Sq Epi: FEW / Non Sq Epi: x / Bacteria: x        Venous Blood Gas:  11-12 @ 06:15  7.37/77/182/39/99.6  VBG Lactate: --  Venous Blood Gas:  11-11 @ 05:03  7.27/100/50/37/75.3  VBG Lactate: 2.3      MICROBIOLOGY:     RADIOLOGY:  [ ] Reviewed and interpreted by me    PULMONARY FUNCTION TESTS:    EKG:

## 2017-11-12 NOTE — CONSULT NOTE ADULT - SUBJECTIVE AND OBJECTIVE BOX
HPI:    Ms. Gracia, 56 yo F PMH metastatic colon adenocarcinoma to liver and lung diagnosed in 2016, s/p mFOLFOX with progression of disease s/p Irinotecan and Vectibix, with progression, planned to start Lonsurf, admitted with worsening sob for a week.  At baseline uses 2 liters O2 NC at rest and with exertion uses 5 liters and can walk up to a block but recently short of breath with minimal exertion and not helped with inhalers.   In ED placed on bipap for increased work of breathing and hypercapnea and feels improvement. Chest tightness also improved. Also reports increased firmeness and distention of abdomen with some discomfort. Denies nausea and vomiting. Has had a dry cough since 2017 which has progressed to productive over past 1 month alternating between clear and yellowish green phlegm. Also reports she is completing antibiotics for a recent UTI  and has 3 more days left to complete a 7 day course. No fevers, chills, sick contacts.    Vital Signs Last 24 Hrs  T(C): 36.4 (2017 08:17), Max: 36.6 (2017 04:35)  T(F): 97.5 (2017 08:17), Max: 97.9 (2017 04:35)  HR: 127 (2017 08:17) (125 - 131)  BP: 145/103 (2017 08:17) (145/103 - 181/117)  BP(mean): --  RR: 22 (2017 08:17) (15 - 32)  SpO2: 100% (2017 08:17) (99% - 100%) (2017 10:01)    PAST MEDICAL & SURGICAL HISTORY:  Colon cancer metastasized to multiple sites: liver and lung  Asthma  Anemia  History of 3  sections  H/O coronary angiogram:     FAMILY HISTORY:  No pertinent family history in first degree relatives    Social History  MEDICATIONS  (STANDING):  aspirin  chewable 81 milliGRAM(s) Oral daily  dexamethasone  IVPB 8 milliGRAM(s) IV Intermittent every 12 hours  enoxaparin Injectable 40 milliGRAM(s) SubCutaneous every 24 hours  influenza   Vaccine 0.5 milliLiter(s) IntraMuscular once  ipratropium    for Nebulization 500 MICROGram(s) Nebulizer every 6 hours    MEDICATIONS  (PRN):  acetaminophen   Tablet. 650 milliGRAM(s) Oral every 6 hours PRN Moderate Pain (4 - 6)  senna 2 Tablet(s) Oral at bedtime PRN Constipation    No Known Allergies    REVIEW OF SYSTEMS    10 points ROS is negative except for the ones in HPI  	  	  Vital Signs Last 24 Hrs  T(C): 37 (2017 13:31), Max: 37 (2017 13:31)  T(F): 98.6 (2017 13:31), Max: 98.6 (2017 13:31)  HR: 112 (2017 15:47) (109 - 161)  BP: 126/93 (2017 13:31) (126/93 - 161/105)  BP(mean): --  RR: 22 (2017 13:31) (19 - 22)  SpO2: 96% (2017 15:47) (96% - 99%)    Physical Examination  Constitutional: Alert, oriented X3  Eyes: Normal  ENMT: normal  Neck: No lymphadneopathy  Respiratory: b/l decreased air entry crackles+  Cardiovascular: S1,S2,M0  Abdomen: Soft, non tender, BS present  Gastrointestinal: soft, non tender, BS present  Extremities: soft, no edema  Neurological: intact  Skin: no petechiae  Musculoskeletal: normal  Psychiatric: normal                            9.7    9.70  )-----------( 341      ( 2017 05:30 )             34.0     -12    146<H>  |  100  |  11  ----------------------------<  113<H>  4.5   |  43<H>  |  0.46<L>    Ca    9.3      2017 05:30  Phos  3.8     11-12  Mg     1.7     -12    TPro  7.2  /  Alb  2.9<L>  /  TBili  0.4  /  DBili  x   /  AST  55<H>  /  ALT  25  /  AlkPhos  284<H>  -12      Radiology  Assessment and Plan

## 2017-11-12 NOTE — PROGRESS NOTE ADULT - PROBLEM SELECTOR PLAN 3
Explained CPR and intubation to patient  Reports she would like to be kept alive as long as possible and would want resuscitation if needed, but does not want to be on a ventilator. Patient made DNI but not DNR.   Palliative consult placed for Monday, for GOC and symptom management

## 2017-11-12 NOTE — PROGRESS NOTE ADULT - PROBLEM SELECTOR PLAN 5
limited left leg US in ED negative for DVT. Check official US leg b/l  Check TTE  Continue lasix 20mg daily

## 2017-11-12 NOTE — PROGRESS NOTE ADULT - PROBLEM SELECTOR PLAN 8
per patient has 3 more days to complete a 7 day course of levaquin for UTI  Prior Urine culture from 10/31 with Ecoli resistant to levaquin, cipro, and ampicillin  recheck UA, Ucx, hold off further antibiotics for now per patient has 3 more days to complete a 7 day course of levaquin for UTI  Prior Urine culture from 10/31 with Ecoli resistant to levaquin, cipro, and ampicillin  UA negative hold off further antibiotics for now

## 2017-11-13 DIAGNOSIS — R53.81 OTHER MALAISE: ICD-10-CM

## 2017-11-13 DIAGNOSIS — Z51.5 ENCOUNTER FOR PALLIATIVE CARE: ICD-10-CM

## 2017-11-13 LAB
BASE EXCESS BLDCOV CALC-SCNC: 17.2 MMOL/L — HIGH (ref -9.3–0.3)
BUN SERPL-MCNC: 14 MG/DL — SIGNIFICANT CHANGE UP (ref 7–23)
CALCIUM SERPL-MCNC: 9.5 MG/DL — SIGNIFICANT CHANGE UP (ref 8.4–10.5)
CHLORIDE SERPL-SCNC: 99 MMOL/L — SIGNIFICANT CHANGE UP (ref 98–107)
CO2 SERPL-SCNC: 42 MMOL/L — HIGH (ref 22–31)
CREAT SERPL-MCNC: 0.45 MG/DL — LOW (ref 0.5–1.3)
GLUCOSE SERPL-MCNC: 109 MG/DL — HIGH (ref 70–99)
GRAM STN SPT: SIGNIFICANT CHANGE UP
HCT VFR BLD CALC: 38.2 % — SIGNIFICANT CHANGE UP (ref 34.5–45)
HGB BLD-MCNC: 10.4 G/DL — LOW (ref 11.5–15.5)
MCHC RBC-ENTMCNC: 24.2 PG — LOW (ref 27–34)
MCHC RBC-ENTMCNC: 27.2 % — LOW (ref 32–36)
MCV RBC AUTO: 89 FL — SIGNIFICANT CHANGE UP (ref 80–100)
NRBC # FLD: 0 — SIGNIFICANT CHANGE UP
PCO2 BLDCOV: 95 MMHG — HIGH (ref 27–49)
PH BLDCOV: 7.29 PH — SIGNIFICANT CHANGE UP (ref 7.25–7.45)
PLATELET # BLD AUTO: 385 K/UL — SIGNIFICANT CHANGE UP (ref 150–400)
PMV BLD: 9.7 FL — SIGNIFICANT CHANGE UP (ref 7–13)
PO2 BLDCOA: 115 MMHG — HIGH (ref 17–41)
POTASSIUM SERPL-MCNC: 4.5 MMOL/L — SIGNIFICANT CHANGE UP (ref 3.5–5.3)
POTASSIUM SERPL-SCNC: 4.5 MMOL/L — SIGNIFICANT CHANGE UP (ref 3.5–5.3)
RBC # BLD: 4.29 M/UL — SIGNIFICANT CHANGE UP (ref 3.8–5.2)
RBC # FLD: 21.4 % — HIGH (ref 10.3–14.5)
SODIUM SERPL-SCNC: 145 MMOL/L — SIGNIFICANT CHANGE UP (ref 135–145)
SPECIMEN SOURCE: SIGNIFICANT CHANGE UP
SPECIMEN SOURCE: SIGNIFICANT CHANGE UP
WBC # BLD: 9.92 K/UL — SIGNIFICANT CHANGE UP (ref 3.8–10.5)
WBC # FLD AUTO: 9.92 K/UL — SIGNIFICANT CHANGE UP (ref 3.8–10.5)

## 2017-11-13 PROCEDURE — 93970 EXTREMITY STUDY: CPT | Mod: 26

## 2017-11-13 PROCEDURE — 99223 1ST HOSP IP/OBS HIGH 75: CPT

## 2017-11-13 PROCEDURE — 99233 SBSQ HOSP IP/OBS HIGH 50: CPT | Mod: GC

## 2017-11-13 PROCEDURE — 93010 ELECTROCARDIOGRAM REPORT: CPT

## 2017-11-13 RX ORDER — SODIUM CHLORIDE 0.65 %
1 AEROSOL, SPRAY (ML) NASAL EVERY 6 HOURS
Qty: 0 | Refills: 0 | Status: DISCONTINUED | OUTPATIENT
Start: 2017-11-13 | End: 2017-11-15

## 2017-11-13 RX ADMIN — Medication 500 MICROGRAM(S): at 20:25

## 2017-11-13 RX ADMIN — Medication 100 MILLIGRAM(S): at 19:38

## 2017-11-13 RX ADMIN — Medication 500 MICROGRAM(S): at 04:18

## 2017-11-13 RX ADMIN — Medication 81 MILLIGRAM(S): at 12:05

## 2017-11-13 NOTE — PROGRESS NOTE ADULT - PROBLEM SELECTOR PLAN 1
likely due to worsening lung metastases with poor lung reserve volume  Sinus tachycardia likely due to underlying lung disease burden. Will check TTE  Cardizem started on 11/12 ATC with good results  Bipap alternating with nasal cannula as tolerated  CTA chest negative for PE and with diffuse lung mets  Continue chronic prednisone 5mg, ASA 81mg  Ventolin HFA PRN  Atrovent q6 ATC given tachycardia

## 2017-11-13 NOTE — CONSULT NOTE ADULT - PROBLEM SELECTOR RECOMMENDATION 9
Secondary to tumor burden/worsening disease.   Recommended changing Prednisone 20 mg to dexamethasone 8 mg twice per day.  Continue with nebulizers and BiPAP, as needed for Pulmonary team.
Actively getting oral chemotherapy. follows up at [facility]. Appreciate Oncology involvement.

## 2017-11-13 NOTE — CONSULT NOTE ADULT - PROBLEM SELECTOR RECOMMENDATION 2
Will await blood culture result before starting Lonsurt.  D/W patient and daughter in length regarding the diagnosis and prognosis. MOLST form discussed and provided.    D/W team.    Please page at [phone] with questions or concerns.
Patient would benefit from low dose morphine to help with her symptoms, but patient and daughter are hesitant to try it.

## 2017-11-13 NOTE — PROGRESS NOTE ADULT - PROBLEM SELECTOR PLAN 3
Explained CPR and intubation to patient  Reports she would like to be kept alive as long as possible and would want resuscitation if needed, but does not want to be on a ventilator. Patient made DNI but not DNR.   Palliative consulted for GOC and symptom management

## 2017-11-13 NOTE — CONSULT NOTE ADULT - ASSESSMENT
56 y/o F hx colon cancer with lung and liver lesions, asthma, CAD presents with acute on chronic hypercarbic and hypoxemic respiratory failure.    #Respiratory Failure:  -acute on chronic hypoxemia and hypercarbia likely secondary to underlying malignancy  -differential includes asthma exacerbation vs infectious etiology vs progression of pulmonary metastatic disease  -patient reports symptomatic improvement s/p nebulizers and steroids.   -presently AOx4, plan to trial non-invasive ventilation shortly  -results of CTPA noted to be negative  -obtain repeat blood gas after trial of non-invasive ventilation  -no present need for MICU, discussed with Dr. George Tolentino   P: [id number]
Ms. Gracia, 58 yo F PMH metastatic colon adenocarcinoma to liver and lung diagnosed in 7/2016, s/p mFOLFOX with progression of disease s/p Irinotecan and Vectibix, with progression, planned to start Lonsurf, admitted with worsening sob for a week.
57 year old woman with metastatic colon adenocarcinoma, SOB, debility encounter for palliative care.

## 2017-11-13 NOTE — PROGRESS NOTE ADULT - SUBJECTIVE AND OBJECTIVE BOX
CHIEF COMPLAINT:    Interval Events:    REVIEW OF SYSTEMS:  Constitutional:   Eyes:  ENT:  CV:  Resp:  GI:  :  MSK:  Integumentary:  Neurological:  Psychiatric:  Endocrine:  Hematologic/Lymphatic:  Allergic/Immunologic:  [ ] All other systems negative  [ ] Unable to assess ROS because ________    OBJECTIVE:  ICU Vital Signs Last 24 Hrs  T(C): 36.8 (13 Nov 2017 05:41), Max: 37 (12 Nov 2017 13:31)  T(F): 98.3 (13 Nov 2017 05:41), Max: 98.6 (12 Nov 2017 13:31)  HR: 104 (13 Nov 2017 08:03) (104 - 161)  BP: 161/99 (13 Nov 2017 05:41) (126/93 - 161/105)  BP(mean): --  ABP: --  ABP(mean): --  RR: 20 (13 Nov 2017 05:41) (20 - 22)  SpO2: 97% (13 Nov 2017 08:03) (95% - 99%)        CAPILLARY BLOOD GLUCOSE      POCT Blood Glucose.: 147 mg/dL (12 Nov 2017 17:10)      PHYSICAL EXAM:  General:   HEENT:   Lymph Nodes:  Neck:   Respiratory:   Cardiovascular:   Abdomen:   Extremities:   Skin:   Neurological:  Psychiatry:    HOSPITAL MEDICATIONS:  MEDICATIONS  (STANDING):  aspirin  chewable 81 milliGRAM(s) Oral daily  diltiazem    Tablet 30 milliGRAM(s) Oral every 6 hours  enoxaparin Injectable 40 milliGRAM(s) SubCutaneous every 24 hours  influenza   Vaccine 0.5 milliLiter(s) IntraMuscular once  ipratropium    for Nebulization 500 MICROGram(s) Nebulizer every 6 hours    MEDICATIONS  (PRN):  acetaminophen   Tablet. 650 milliGRAM(s) Oral every 6 hours PRN Moderate Pain (4 - 6)  senna 2 Tablet(s) Oral at bedtime PRN Constipation      LABS:                        10.4   9.92  )-----------( 385      ( 13 Nov 2017 06:30 )             38.2     11-13    145  |  99  |  14  ----------------------------<  109<H>  4.5   |  42<H>  |  0.45<L>    Ca    9.5      13 Nov 2017 06:30  Phos  3.8     11-12  Mg     1.7     11-12    TPro  7.2  /  Alb  2.9<L>  /  TBili  0.4  /  DBili  x   /  AST  55<H>  /  ALT  25  /  AlkPhos  284<H>  11-12      Urinalysis Basic - ( 11 Nov 2017 14:49 )    Color: YELLOW / Appearance: CLEAR / SG: > 1.040 / pH: 6.0  Gluc: NEGATIVE / Ketone: NEGATIVE  / Bili: NEGATIVE / Urobili: NORMAL E.U.   Blood: NEGATIVE / Protein: 30 / Nitrite: NEGATIVE   Leuk Esterase: NEGATIVE / RBC: 0-2 / WBC 0-2   Sq Epi: FEW / Non Sq Epi: x / Bacteria: x        Venous Blood Gas:  11-12 @ 06:15  7.37/77/182/39/99.6  VBG Lactate: --      MICROBIOLOGY:     RADIOLOGY:  [ ] Reviewed and interpreted by me    PULMONARY FUNCTION TESTS:    EKG: CHIEF COMPLAINT: Patient is a 57y old  Female who presents with a chief complaint of chest tightness, worsening SOB (11 Nov 2017 10:01)    Interval Events: No acute events overnight    REVIEW OF SYSTEMS:  Constitutional: Not feeling back to baseline  Eyes:  ENT:  CV: Denies CP  Resp: c/o sob especially with exertion  GI: Denies abdominal pain. Good appetite  :  MSK:  Integumentary:  Neurological:  Psychiatric:  Endocrine:  Hematologic/Lymphatic:  Allergic/Immunologic:  [x] All other systems negative  [ ] Unable to assess ROS because ________    OBJECTIVE:  ICU Vital Signs Last 24 Hrs  T(C): 36.8 (13 Nov 2017 05:41), Max: 37 (12 Nov 2017 13:31)  T(F): 98.3 (13 Nov 2017 05:41), Max: 98.6 (12 Nov 2017 13:31)  HR: 104 (13 Nov 2017 08:03) (104 - 161)  BP: 161/99 (13 Nov 2017 05:41) (126/93 - 161/105)  BP(mean): --  ABP: --  ABP(mean): --  RR: 20 (13 Nov 2017 05:41) (20 - 22)  SpO2: 97% (13 Nov 2017 08:03) (95% - 99%)        CAPILLARY BLOOD GLUCOSE      POCT Blood Glucose.: 147 mg/dL (12 Nov 2017 17:10)    HOSPITAL MEDICATIONS:  MEDICATIONS  (STANDING):  aspirin  chewable 81 milliGRAM(s) Oral daily  diltiazem    Tablet 30 milliGRAM(s) Oral every 6 hours  enoxaparin Injectable 40 milliGRAM(s) SubCutaneous every 24 hours  influenza   Vaccine 0.5 milliLiter(s) IntraMuscular once  ipratropium    for Nebulization 500 MICROGram(s) Nebulizer every 6 hours    MEDICATIONS  (PRN):  acetaminophen   Tablet. 650 milliGRAM(s) Oral every 6 hours PRN Moderate Pain (4 - 6)  senna 2 Tablet(s) Oral at bedtime PRN Constipation      LABS:                        10.4   9.92  )-----------( 385      ( 13 Nov 2017 06:30 )             38.2     11-13    145  |  99  |  14  ----------------------------<  109<H>  4.5   |  42<H>  |  0.45<L>    Ca    9.5      13 Nov 2017 06:30  Phos  3.8     11-12  Mg     1.7     11-12    TPro  7.2  /  Alb  2.9<L>  /  TBili  0.4  /  DBili  x   /  AST  55<H>  /  ALT  25  /  AlkPhos  284<H>  11-12      Urinalysis Basic - ( 11 Nov 2017 14:49 )    Color: YELLOW / Appearance: CLEAR / SG: > 1.040 / pH: 6.0  Gluc: NEGATIVE / Ketone: NEGATIVE  / Bili: NEGATIVE / Urobili: NORMAL E.U.   Blood: NEGATIVE / Protein: 30 / Nitrite: NEGATIVE   Leuk Esterase: NEGATIVE / RBC: 0-2 / WBC 0-2   Sq Epi: FEW / Non Sq Epi: x / Bacteria: x        Venous Blood Gas:  11-12 @ 06:15  7.37/77/182/39/99.6  VBG Lactate: --      MICROBIOLOGY:     RADIOLOGY:  [ ] Reviewed and interpreted by me    PULMONARY FUNCTION TESTS:    EKG:

## 2017-11-13 NOTE — PROGRESS NOTE ADULT - PROBLEM SELECTOR PLAN 2
Mets to lungs and liver  Onc consulted via email  Per patient plan to start new regimen on Mon 11/13, follows at [facility]  Last chemo on 9/22  Spoke to MITCH Adams at [facility], who works with Dr. Lopez. Discussed current condition, and patient's request to send the discharge summary when patient leaves.

## 2017-11-13 NOTE — CONSULT NOTE ADULT - ATTENDING COMMENTS
Acute on chronic hypercapnic respiratory failure with hypoxemia  metastatic colon cancer with pulm and liver involvement  check RVP  Bipap  repeat ABG  RCU
She has respiratory failure from progressive metastatic colon cancer  Due to start another chemotherapy regimen, ahs progressed after two prior regimens  Discussed situation with patient and daughter  Gave them MOLST form and discussed importance of advance directives  Recommend palliative care consultaion    Recommended decadron 8 mg BID for palliation of dyspnea
Patient seen and examined.  Agree with NP note

## 2017-11-13 NOTE — CONSULT NOTE ADULT - PROBLEM SELECTOR RECOMMENDATION 4
Discussion had with patient and her daughter (who was available over the phone). Attempted to educate patient and family about use of low dose opioids to help with Shortness of breathe. They remain hesitant to try it.

## 2017-11-13 NOTE — CONSULT NOTE ADULT - SUBJECTIVE AND OBJECTIVE BOX
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 Her last chemo was received on 9/22/17 and she follow at [facility] and is due to start a new treatment on Mon 11/13, which she will miss as she is admitted to the hospital. At baseline uses 2 liters O2 NC at rest and with exertion uses 5 liters and can walk up to a block but recently short of breath with minimal exertion and not helped with inhalers. She was placed on bipap with improvement.     Vital Signs Last 24 Hrs  T(C): 36.4 (11 Nov 2017 08:17), Max: 36.6 (11 Nov 2017 04:35)  T(F): 97.5 (11 Nov 2017 08:17), Max: 97.9 (11 Nov 2017 04:35)  HR: 127 (11 Nov 2017 08:17) (125 - 131)  BP: 145/103 (11 Nov 2017 08:17) (145/103 - 181/117)  BP(mean): --  RR: 22 (11 Nov 2017 08:17) (15 - 32)  SpO2: 100% (11 Nov 2017 08:17) (99% - 100%) (11 Nov 2017 10:01)      PERTINENT PMH REVIEWED:  [ ] YES [ ] NO           SOCIAL HISTORY:  Significant other/partner:  [ ] YES  [ ] NO            Children:  [ ] YES  [ ] NO                   Congregation/Spirituality:  Substance hx:  [ ] YES   [ ] NO           Tobacco hx:  [ ] YES  [ ] NO             Alcohol hx: [ ] YES  [ ] NO        Home Opioid hx:  [ ] YES  [ ] NO   Living Situation: [ ] Home  [ ] Long term care  [ ] Rehab    FAMILY HISTORY:  No pertinent family history in first degree relatives    [ ] Family history non contributory     BASELINE ADLs (prior to admission):  Independent [ ] moderately [ ] fully   Dependent   [ ] moderately [ ] fully    Code Status:                      MOLST  [ ] YES [ ] NO    Living Will  [ ] YES [ ] NO    Health Care Proxy [ ] YES  [ ] NO      [ ] Surrogate  [ ] HCP  [ ] Guardian:                                                                  Phone#:    Allergies    No Known Allergies    Intolerances        MEDICATIONS  (STANDING):  aspirin  chewable 81 milliGRAM(s) Oral daily  diltiazem    Tablet 30 milliGRAM(s) Oral every 6 hours  enoxaparin Injectable 40 milliGRAM(s) SubCutaneous every 24 hours  influenza   Vaccine 0.5 milliLiter(s) IntraMuscular once  ipratropium    for Nebulization 500 MICROGram(s) Nebulizer every 6 hours    MEDICATIONS  (PRN):  acetaminophen   Tablet. 650 milliGRAM(s) Oral every 6 hours PRN Moderate Pain (4 - 6)  senna 2 Tablet(s) Oral at bedtime PRN Constipation      PRESENT SYMPTOMS:  Source: [ ] Patient   [ ] Family   [ ] Team     Pain: [ ] YES [ ] NO  Onset:                    Location:                          Duration:                 Character:            Aggravating factors:                        Relieving factors:    Radiation:              Timing:                             Severity:      Dyspnea: [ ] YES [ ] NO - Mild [ ]  Moderate [ ]  Severe [ ]    Anxiety: [ ] YES [ ] NO  Fatigue: [ ] YES [ ] NO   Nausea: [ ] YES [ ] NO  Loss of appetite: [ ] YES [ ] NO   Constipation: [ ] YES [ ] NO     Other Symptoms:  [ ] All other review of systems negative   [ ] Unable to obtain due to poor mentation     Does patient meet criteria for Severe Protein Calorie Malnutrition?  Yes [ ]  No [ ]  PPSV 30% or below [ ]  Anasarca [ ]  Albumin < 2 [ ] Catabolic State [ ] Poor nutritional intake [ ] Significant weight loss [ ]      Palliative Performance Status Version 2:         %  ECOG -        Vital Signs Last 24 Hrs  T(C): 36.8 (13 Nov 2017 05:41), Max: 37 (12 Nov 2017 13:31)  T(F): 98.3 (13 Nov 2017 05:41), Max: 98.6 (12 Nov 2017 13:31)  HR: 104 (13 Nov 2017 08:03) (104 - 161)  BP: 161/99 (13 Nov 2017 05:41) (126/93 - 161/99)  BP(mean): --  RR: 20 (13 Nov 2017 05:41) (20 - 22)  SpO2: 97% (13 Nov 2017 08:03) (95% - 99%)    Physical Exam:    General: [ ] Alert,  A&O x     [ ] lethargic   [ ] Agitated   [ ] Cachexia   HEENT: [ ] Normal   [ ] Dry mouth   [ ] ET Tube    [ ] Trach   Lungs: [ ] Clear [ ] Rhonchi  [ ] Crackles [ ] Wheezing [ ] Tachypnea  [ ] Audible excessive secretions    Cardiovascular:  [ ] Regular rate and rhythm  [ ] Irregular [ ] Tachycardia   [ ] Bradycardia   Abdomen: [ ] Soft  [ ] Distended  [ ] +BS  [ ] Non tender [ ] Tender  [ ]PEG   [ ] NGT   Last BM:     Genitourinary: [ ] Normal [ ] Incontinent   [ ] Oliguria/Anuria   [ ] Jimenez  Musculoskeletal:  [ ] Normal   [ ] Generalized weakness  [ ] Bedbound  [ ] Edema   Neurological: [ ] No focal deficits  [ ] Cognitive impairment     Skin: [ ] Normal   [ ] Pressure ulcers     LABS:                        10.4   9.92  )-----------( 385      ( 13 Nov 2017 06:30 )             38.2     11-13    145  |  99  |  14  ----------------------------<  109<H>  4.5   |  42<H>  |  0.45<L>    Ca    9.5      13 Nov 2017 06:30  Phos  3.8     11-12  Mg     1.7     11-12    TPro  7.2  /  Alb  2.9<L>  /  TBili  0.4  /  DBili  x   /  AST  55<H>  /  ALT  25  /  AlkPhos  284<H>  11-12      Urinalysis Basic - ( 11 Nov 2017 14:49 )    Color: YELLOW / Appearance: CLEAR / SG: > 1.040 / pH: 6.0  Gluc: NEGATIVE / Ketone: NEGATIVE  / Bili: NEGATIVE / Urobili: NORMAL E.U.   Blood: NEGATIVE / Protein: 30 / Nitrite: NEGATIVE   Leuk Esterase: NEGATIVE / RBC: 0-2 / WBC 0-2   Sq Epi: FEW / Non Sq Epi: x / Bacteria: x      I&O's Summary      RADIOLOGY & ADDITIONAL STUDIES:    EXAM:  CT ANGIO CHEST (W)AW IC    PROCEDURE DATE:  Nov 11 2017     IMPRESSION:   No pulmonary embolus.  Unchanged chest CT when compared to 10/31/2017    EXAM:  RAD CHEST PORTABLE IMMEDIATE    PROCEDURE DATE:  Nov 11 2017     IMPRESSION:   Bilateral confluent metastatic disease.      Referrals:  Hospice [ ]   Chaplaincy [ ]    Child Life [ ]   Social Work [ ]   Case Management [ ]   Holistic Therapy [ ] HPI:  57 year old womand with metastatic colon adenocarcinoma to liver and lungs was treated initially with FOLFOX and Bevacizumab but changed to Irinotecan and Panitumumab given progression of disease who presented with worsening shortness of breath and chest tightness. Her last chemo was received on 9/22/17 and she follow at [facility] and is due to start a new treatment on Mon 11/13 (which she has with her. At baseline uses 2 liters O2 NC at rest and with exertion uses 5 liters and can walk up to a block but recently short of breath with minimal exertion and not helped with inhalers. She was placed on bipap with improvement. Patient examined while resting in a chair in no active distress. She does complain of shortness of breathe.     Vital Signs Last 24 Hrs  T(C): 36.4 (11 Nov 2017 08:17), Max: 36.6 (11 Nov 2017 04:35)  T(F): 97.5 (11 Nov 2017 08:17), Max: 97.9 (11 Nov 2017 04:35)  HR: 127 (11 Nov 2017 08:17) (125 - 131)  BP: 145/103 (11 Nov 2017 08:17) (145/103 - 181/117)  BP(mean): --  RR: 22 (11 Nov 2017 08:17) (15 - 32)  SpO2: 100% (11 Nov 2017 08:17) (99% - 100%) (11 Nov 2017 10:01)      PERTINENT PMH REVIEWED:  [x ] YES [ ] NO           SOCIAL HISTORY:  Significant other/partner:  [ ] YES  [x ] NO            Children:  [x ] YES  [ ] NO                   Pentecostal/Spirituality:  Substance hx:  [ ] YES   [ ] NO- unknown           Tobacco hx:  [ ] YES  [ ] NO  -unknown           Alcohol hx: [ ] YES  [ ] NO - unknown        Home Opioid hx:  [ ] YES  [ x] NO   Living Situation: [x ] Home  [ ] Long term care  [ ] Rehab    FAMILY HISTORY:  No pertinent family history in first degree relatives    [ ] Family history non contributory     BASELINE ADLs (prior to admission):  Independent [x ] moderately [ ] fully   Dependent   [ ] moderately [ ] fully    Code Status:                      MOLST  [ ] YES [ x] NO    Living Will  [ ] YES [x ] NO    Health Care Proxy [ ] YES  [x ] NO      [x ] Surrogate  [ ] HCP  [ ] Guardian:         Tasha Constantino                                                         Phone#: [phone]    Allergies    No Known Allergies    Intolerances    MEDICATIONS  (STANDING):  aspirin  chewable 81 milliGRAM(s) Oral daily  diltiazem    Tablet 30 milliGRAM(s) Oral every 6 hours  enoxaparin Injectable 40 milliGRAM(s) SubCutaneous every 24 hours  influenza   Vaccine 0.5 milliLiter(s) IntraMuscular once  ipratropium    for Nebulization 500 MICROGram(s) Nebulizer every 6 hours    MEDICATIONS  (PRN):  acetaminophen   Tablet. 650 milliGRAM(s) Oral every 6 hours PRN Moderate Pain (4 - 6)  senna 2 Tablet(s) Oral at bedtime PRN Constipation      PRESENT SYMPTOMS:  Source: [x ] Patient   [ ] Family   [ ] Team     Pain: [ ] YES [x ] NO  Onset:                    Location:                          Duration:                 Character:            Aggravating factors:                        Relieving factors:    Radiation:              Timing:                             Severity:      Dyspnea: [x ] YES [ ] NO - Mild [x ]  Moderate [ ]  Severe [ ]    Anxiety: [ ] YES [x ] NO  Fatigue: [ ] YES [x ] NO   Nausea: [ ] YES [x ] NO  Loss of appetite: [ ] YES [x ] NO   Constipation: [ ] YES [ x] NO     Other Symptoms:  [ ] All other review of systems negative   [ ] Unable to obtain due to poor mentation     Does patient meet criteria for Severe Protein Calorie Malnutrition?  Yes [ ]  No [ ]  PPSV 30% or below [ ]  Anasarca [ ]  Albumin < 2 [ ] Catabolic State [ ] Poor nutritional intake [ ] Significant weight loss [ ]      Palliative Performance Status Version 2:        60 %  ECOG -        Vital Signs Last 24 Hrs  T(C): 36.8 (13 Nov 2017 05:41), Max: 37 (12 Nov 2017 13:31)  T(F): 98.3 (13 Nov 2017 05:41), Max: 98.6 (12 Nov 2017 13:31)  HR: 104 (13 Nov 2017 08:03) (104 - 161)  BP: 161/99 (13 Nov 2017 05:41) (126/93 - 161/99)  BP(mean): --  RR: 20 (13 Nov 2017 05:41) (20 - 22)  SpO2: 97% (13 Nov 2017 08:03) (95% - 99%)    Physical Exam:    General: [x ] Alert,  A&O x3     [ ] lethargic   [ ] Agitated   [ ] Cachexia   HEENT: [x ] Normal   [ ] Dry mouth   [ ] ET Tube    [ ] Trach   Lungs: [ ] Clear [ ] Rhonchi  [ ] Crackles [ ] Wheezing [ ] Tachypnea  [ ] Audible excessive secretions  [x] Diminished lung sounds  Cardiovascular:  [ ] Regular rate and rhythm  [ ] Irregular [ x] Tachycardia   [ ] Bradycardia   Abdomen: [ ] Soft  [x ] Distended  [ ] +BS  [ ] Non tender [ ] Tender  [ ]PEG   [ ] NGT   Last BM:     Genitourinary: [x ] Normal [ ] Incontinent   [ ] Oliguria/Anuria   [ ] Jimenez  Musculoskeletal:  [ ] Normal   [x ] Generalized weakness  [ ] Bedbound  [ ] Edema   Neurological: [x ] No focal deficits  [ ] Cognitive impairment     Skin: [ x] Normal   [ ] Pressure ulcers     LABS:                        10.4   9.92  )-----------( 385      ( 13 Nov 2017 06:30 )             38.2     11-13    145  |  99  |  14  ----------------------------<  109<H>  4.5   |  42<H>  |  0.45<L>    Ca    9.5      13 Nov 2017 06:30  Phos  3.8     11-12  Mg     1.7     11-12    TPro  7.2  /  Alb  2.9<L>  /  TBili  0.4  /  DBili  x   /  AST  55<H>  /  ALT  25  /  AlkPhos  284<H>  11-12      Urinalysis Basic - ( 11 Nov 2017 14:49 )    Color: YELLOW / Appearance: CLEAR / SG: > 1.040 / pH: 6.0  Gluc: NEGATIVE / Ketone: NEGATIVE  / Bili: NEGATIVE / Urobili: NORMAL E.U.   Blood: NEGATIVE / Protein: 30 / Nitrite: NEGATIVE   Leuk Esterase: NEGATIVE / RBC: 0-2 / WBC 0-2   Sq Epi: FEW / Non Sq Epi: x / Bacteria: x      I&O's Summary      RADIOLOGY & ADDITIONAL STUDIES:    EXAM:  CT ANGIO CHEST (W)AW IC    PROCEDURE DATE:  Nov 11 2017     IMPRESSION:   No pulmonary embolus.  Unchanged chest CT when compared to 10/31/2017    EXAM:  RAD CHEST PORTABLE IMMEDIATE    PROCEDURE DATE:  Nov 11 2017     IMPRESSION:   Bilateral confluent metastatic disease.      Referrals:  Hospice [ ]   Chaplaincy [ ]    Child Life [ ]   Social Work [ ]   Case Management [ ]   Holistic Therapy [ ]

## 2017-11-13 NOTE — PROGRESS NOTE ADULT - ADDITIONAL PE
Refused evening dose of cardizem. Discussed the purpose and action of this medication. Patient still refusing.

## 2017-11-13 NOTE — CHART NOTE - NSCHARTNOTEFT_GEN_A_CORE
Notified by RN that HR is 162. Seen patient at bedside. Patient is alert and oriented x4 . denies chest pain , dizziness , positive for shortness of breath ( at baseline ) . EKG done --showed sinus tachycardia HR at 161. patient is on Cardizem 30 mg po Q6 hours for tachycardia. Patient had refused to take 6 pm dose . Due cardizem 30 mg po given . Explained to patient  importances of taking Cardizem . Patient verbalized understanding. HR post Cardizem is 112.   ICU Vital Signs Last 24 Hrs  T(C): 36.9 (13 Nov 2017 23:11), Max: 36.9 (13 Nov 2017 23:11)  T(F): 98.4 (13 Nov 2017 23:11), Max: 98.4 (13 Nov 2017 23:11)  HR: 112 (13 Nov 2017 23:26) (104 - 162)  BP: 150/99 (13 Nov 2017 23:11) (134/87 - 161/99)  BP(mean): --  ABP: --  ABP(mean): --  RR: 19 (13 Nov 2017 23:11) (19 - 20)  SpO2: 100% (13 Nov 2017 23:26) (96% - 100%)

## 2017-11-13 NOTE — PROGRESS NOTE ADULT - PROBLEM SELECTOR PLAN 8
per patient has 3 more days to complete a 7 day course of levaquin for UTI  Prior Urine culture from 10/31 with Ecoli resistant to levaquin, cipro, and ampicillin  UA negative hold off further antibiotics for now

## 2017-11-14 LAB
-  AMIKACIN: SIGNIFICANT CHANGE UP
-  AMPICILLIN/SULBACTAM: SIGNIFICANT CHANGE UP
-  AMPICILLIN: SIGNIFICANT CHANGE UP
-  AZTREONAM: SIGNIFICANT CHANGE UP
-  CEFAZOLIN: SIGNIFICANT CHANGE UP
-  CEFEPIME: SIGNIFICANT CHANGE UP
-  CEFOXITIN: SIGNIFICANT CHANGE UP
-  CEFTAZIDIME: SIGNIFICANT CHANGE UP
-  CEFTRIAXONE: SIGNIFICANT CHANGE UP
-  CIPROFLOXACIN: SIGNIFICANT CHANGE UP
-  ERTAPENEM: SIGNIFICANT CHANGE UP
-  GENTAMICIN: SIGNIFICANT CHANGE UP
-  IMIPENEM: SIGNIFICANT CHANGE UP
-  LEVOFLOXACIN: SIGNIFICANT CHANGE UP
-  MEROPENEM: SIGNIFICANT CHANGE UP
-  NITROFURANTOIN: SIGNIFICANT CHANGE UP
-  PIPERACILLIN/TAZOBACTAM: SIGNIFICANT CHANGE UP
-  TIGECYCLINE: SIGNIFICANT CHANGE UP
-  TOBRAMYCIN: SIGNIFICANT CHANGE UP
-  TRIMETHOPRIM/SULFAMETHOXAZOLE: SIGNIFICANT CHANGE UP
BACTERIA UR CULT: SIGNIFICANT CHANGE UP
BASOPHILS # BLD AUTO: 0.01 K/UL — SIGNIFICANT CHANGE UP (ref 0–0.2)
BASOPHILS NFR BLD AUTO: 0.1 % — SIGNIFICANT CHANGE UP (ref 0–2)
BUN SERPL-MCNC: 13 MG/DL — SIGNIFICANT CHANGE UP (ref 7–23)
CALCIUM SERPL-MCNC: 9.3 MG/DL — SIGNIFICANT CHANGE UP (ref 8.4–10.5)
CHLORIDE SERPL-SCNC: 94 MMOL/L — LOW (ref 98–107)
CO2 SERPL-SCNC: 37 MMOL/L — HIGH (ref 22–31)
CREAT SERPL-MCNC: 0.38 MG/DL — LOW (ref 0.5–1.3)
EOSINOPHIL # BLD AUTO: 0.07 K/UL — SIGNIFICANT CHANGE UP (ref 0–0.5)
EOSINOPHIL NFR BLD AUTO: 0.6 % — SIGNIFICANT CHANGE UP (ref 0–6)
GLUCOSE SERPL-MCNC: 96 MG/DL — SIGNIFICANT CHANGE UP (ref 70–99)
HCT VFR BLD CALC: 36.6 % — SIGNIFICANT CHANGE UP (ref 34.5–45)
HGB BLD-MCNC: 10.2 G/DL — LOW (ref 11.5–15.5)
IMM GRANULOCYTES # BLD AUTO: 0.13 # — SIGNIFICANT CHANGE UP
IMM GRANULOCYTES NFR BLD AUTO: 1 % — SIGNIFICANT CHANGE UP (ref 0–1.5)
LYMPHOCYTES # BLD AUTO: 0.74 K/UL — LOW (ref 1–3.3)
LYMPHOCYTES # BLD AUTO: 5.9 % — LOW (ref 13–44)
MCHC RBC-ENTMCNC: 24.5 PG — LOW (ref 27–34)
MCHC RBC-ENTMCNC: 27.9 % — LOW (ref 32–36)
MCV RBC AUTO: 87.8 FL — SIGNIFICANT CHANGE UP (ref 80–100)
METHOD TYPE: SIGNIFICANT CHANGE UP
MONOCYTES # BLD AUTO: 1.44 K/UL — HIGH (ref 0–0.9)
MONOCYTES NFR BLD AUTO: 11.5 % — SIGNIFICANT CHANGE UP (ref 2–14)
NEUTROPHILS # BLD AUTO: 10.17 K/UL — HIGH (ref 1.8–7.4)
NEUTROPHILS NFR BLD AUTO: 80.9 % — HIGH (ref 43–77)
NRBC # FLD: 0 — SIGNIFICANT CHANGE UP
ORGANISM # SPEC MICROSCOPIC CNT: SIGNIFICANT CHANGE UP
ORGANISM # SPEC MICROSCOPIC CNT: SIGNIFICANT CHANGE UP
PLATELET # BLD AUTO: 355 K/UL — SIGNIFICANT CHANGE UP (ref 150–400)
PMV BLD: 9.6 FL — SIGNIFICANT CHANGE UP (ref 7–13)
POTASSIUM SERPL-MCNC: 4 MMOL/L — SIGNIFICANT CHANGE UP (ref 3.5–5.3)
POTASSIUM SERPL-SCNC: 4 MMOL/L — SIGNIFICANT CHANGE UP (ref 3.5–5.3)
RBC # BLD: 4.17 M/UL — SIGNIFICANT CHANGE UP (ref 3.8–5.2)
RBC # FLD: 21.2 % — HIGH (ref 10.3–14.5)
SODIUM SERPL-SCNC: 144 MMOL/L — SIGNIFICANT CHANGE UP (ref 135–145)
WBC # BLD: 12.56 K/UL — HIGH (ref 3.8–10.5)
WBC # FLD AUTO: 12.56 K/UL — HIGH (ref 3.8–10.5)

## 2017-11-14 PROCEDURE — 99233 SBSQ HOSP IP/OBS HIGH 50: CPT | Mod: GC

## 2017-11-14 RX ORDER — MORPHINE SULFATE 50 MG/1
1 CAPSULE, EXTENDED RELEASE ORAL ONCE
Qty: 0 | Refills: 0 | Status: DISCONTINUED | OUTPATIENT
Start: 2017-11-14 | End: 2017-11-14

## 2017-11-14 RX ORDER — METOPROLOL TARTRATE 50 MG
25 TABLET ORAL
Qty: 0 | Refills: 0 | Status: DISCONTINUED | OUTPATIENT
Start: 2017-11-14 | End: 2017-11-15

## 2017-11-14 RX ORDER — METOPROLOL TARTRATE 50 MG
5 TABLET ORAL ONCE
Qty: 0 | Refills: 0 | Status: COMPLETED | OUTPATIENT
Start: 2017-11-14 | End: 2017-11-14

## 2017-11-14 RX ADMIN — MORPHINE SULFATE 1 MILLIGRAM(S): 50 CAPSULE, EXTENDED RELEASE ORAL at 11:50

## 2017-11-14 RX ADMIN — Medication 500 MICROGRAM(S): at 03:06

## 2017-11-14 RX ADMIN — Medication 81 MILLIGRAM(S): at 11:02

## 2017-11-14 RX ADMIN — Medication 5 MILLIGRAM(S): at 05:58

## 2017-11-14 RX ADMIN — Medication 500 MICROGRAM(S): at 10:07

## 2017-11-14 RX ADMIN — Medication 500 MICROGRAM(S): at 22:00

## 2017-11-14 NOTE — DIETITIAN INITIAL EVALUATION ADULT. - PROBLEM SELECTOR PLAN 3
Explained CPR and intubation to patient  Reports she would like to be kept alive as long as possible and would want resuscitation if needed  Full code, will likely need to be readdressed this admission.   Advanced care planning time: 30 min  Consider palliative consult  HCP: daughter: Tasha Jason: [phone]

## 2017-11-14 NOTE — PROGRESS NOTE ADULT - PROBLEM SELECTOR PLAN 4
in setting of active malignancy  LE US at bedside negative for DVT in ED Explained CPR and intubation to patient  Reports she would like to be kept alive as long as possible and would want resuscitation if needed, but does not want to be on a ventilator. Patient made DNI but not DNR.   Palliative consulted for GOC and symptom management

## 2017-11-14 NOTE — PROGRESS NOTE ADULT - SUBJECTIVE AND OBJECTIVE BOX
CHIEF COMPLAINT:    Interval Events:    REVIEW OF SYSTEMS:  Constitutional:   Eyes:  ENT:  CV:  Resp:  GI:  :  MSK:  Integumentary:  Neurological:  Psychiatric:  Endocrine:  Hematologic/Lymphatic:  Allergic/Immunologic:  [ ] All other systems negative  [ ] Unable to assess ROS because ________    OBJECTIVE:  ICU Vital Signs Last 24 Hrs  T(C): 36 (14 Nov 2017 06:01), Max: 36.9 (13 Nov 2017 23:11)  T(F): 96.8 (14 Nov 2017 06:01), Max: 98.4 (13 Nov 2017 23:11)  HR: 118 (14 Nov 2017 06:27) (110 - 162)  BP: 142/99 (14 Nov 2017 06:01) (134/87 - 150/99)  BP(mean): --  ABP: --  ABP(mean): --  RR: 19 (14 Nov 2017 06:01) (19 - 19)  SpO2: 94% (14 Nov 2017 06:27) (92% - 100%)        CAPILLARY BLOOD GLUCOSE      POCT Blood Glucose.: 147 mg/dL (12 Nov 2017 17:10)        HOSPITAL MEDICATIONS:  MEDICATIONS  (STANDING):  aspirin  chewable 81 milliGRAM(s) Oral daily  diltiazem    Tablet 30 milliGRAM(s) Oral every 6 hours  enoxaparin Injectable 40 milliGRAM(s) SubCutaneous every 24 hours  influenza   Vaccine 0.5 milliLiter(s) IntraMuscular once  ipratropium    for Nebulization 500 MICROGram(s) Nebulizer every 6 hours  predniSONE   Tablet 5 milliGRAM(s) Oral daily    MEDICATIONS  (PRN):  acetaminophen   Tablet. 650 milliGRAM(s) Oral every 6 hours PRN Moderate Pain (4 - 6)  senna 2 Tablet(s) Oral at bedtime PRN Constipation  sodium chloride 0.65% Nasal 1 Spray(s) Both Nostrils every 6 hours PRN Nasal Congestion      LABS:                        10.2   12.56 )-----------( 355      ( 14 Nov 2017 06:00 )             36.6     11-13    145  |  99  |  14  ----------------------------<  109<H>  4.5   |  42<H>  |  0.45<L>    Ca    9.5      13 Nov 2017 06:30                MICROBIOLOGY:     RADIOLOGY:  [ ] Reviewed and interpreted by me    PULMONARY FUNCTION TESTS:    EKG: CHIEF COMPLAINT: no complaints    Interval Events: tachycardia overnight after refusing Cardizem, improved to baseline after administered    REVIEW OF SYSTEMS:  CV: c/o palpitations last evening during tachycardic event, denies having chest pain  Resp: Some SOB on exertion  [x] All other systems negative      OBJECTIVE:  ICU Vital Signs Last 24 Hrs  T(C): 36 (14 Nov 2017 06:01), Max: 36.9 (13 Nov 2017 23:11)  T(F): 96.8 (14 Nov 2017 06:01), Max: 98.4 (13 Nov 2017 23:11)  HR: 118 (14 Nov 2017 06:27) (110 - 162)  BP: 142/99 (14 Nov 2017 06:01) (134/87 - 150/99)  RR: 19 (14 Nov 2017 06:01) (19 - 19)  SpO2: 94% (14 Nov 2017 06:27) (92% - 100%)        POCT Blood Glucose.: 147 mg/dL (12 Nov 2017 17:10)        HOSPITAL MEDICATIONS:  MEDICATIONS  (STANDING):  aspirin  chewable 81 milliGRAM(s) Oral daily  diltiazem    Tablet 30 milliGRAM(s) Oral every 6 hours  enoxaparin Injectable 40 milliGRAM(s) SubCutaneous every 24 hours  influenza   Vaccine 0.5 milliLiter(s) IntraMuscular once  ipratropium    for Nebulization 500 MICROGram(s) Nebulizer every 6 hours  predniSONE   Tablet 5 milliGRAM(s) Oral daily    MEDICATIONS  (PRN):  acetaminophen   Tablet. 650 milliGRAM(s) Oral every 6 hours PRN Moderate Pain (4 - 6)  senna 2 Tablet(s) Oral at bedtime PRN Constipation  sodium chloride 0.65% Nasal 1 Spray(s) Both Nostrils every 6 hours PRN Nasal Congestion      LABS:                        10.2   12.56 )-----------( 355      ( 14 Nov 2017 06:00 )             36.6     11-14    144  |  94<L>  |  13  ----------------------------<  96  4.0   |  37<H>  |  0.38<L>    Ca    9.3      14 Nov 2017 06:00      EKG: Atrial tachycardia, no ischemic changes

## 2017-11-14 NOTE — PROGRESS NOTE ADULT - PROBLEM SELECTOR PLAN 1
likely due to worsening lung metastases with poor lung reserve volume  Sinus tachycardia likely due to underlying lung disease burden. Will check TTE  Cardizem started on 11/12 ATC with good results  Bipap alternating with nasal cannula as tolerated  CTA chest negative for PE and with diffuse lung mets  Continue chronic prednisone 5mg, ASA 81mg  Ventolin HFA PRN  Atrovent q6 ATC given tachycardia likely due to worsening lung metastases with poor lung reserve volume  F/u ABG  Continue bronchodilators as needed  Can get morphine for dyspnea

## 2017-11-14 NOTE — PROGRESS NOTE ADULT - PROBLEM SELECTOR PLAN 7
H/H presently stable  No active signs of bleeding  Transfuse for Hgb<7

## 2017-11-14 NOTE — DIETITIAN INITIAL EVALUATION ADULT. - OTHER INFO
Nutrition consult received on 11/11/17 for unintentional wt. loss prior to admission. Pt. w/ hx. of metastatic colon cancer to liver and lungs , was on treatment , DNI , tolerating PO diet , reports no taste to  foods , event for cultural foods .  Offered  substitutions , informed the availability of RDN .

## 2017-11-14 NOTE — PROGRESS NOTE ADULT - PROBLEM SELECTOR PLAN 2
Mets to lungs and liver  Onc consulted via email  Per patient plan to start new regimen on Mon 11/13, follows at [facility]  Last chemo on 9/22  Spoke to MITCH Adams at [facility], who works with Dr. Lopez. Discussed current condition, and patient's request to send the discharge summary when patient leaves. EKG--Atrial tachycardia  Likely 2/2 underlying lung disease burden  Will increase Cardizem to 60 mg every 6 hours and add metoprolol  Monitor BP tolerance

## 2017-11-14 NOTE — PROGRESS NOTE ADULT - PROBLEM SELECTOR PLAN 6
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

## 2017-11-14 NOTE — PROGRESS NOTE ADULT - PROBLEM SELECTOR PLAN 3
Explained CPR and intubation to patient  Reports she would like to be kept alive as long as possible and would want resuscitation if needed, but does not want to be on a ventilator. Patient made DNI but not DNR.   Palliative consulted for GOC and symptom management Mets to lungs and liver  Per patient plan to start new regimen on discharge, follows at [facility]  Last chemo on 9/22  Spoke to MITCH Adams at [facility], who works with Dr. Lopez. Discussed current condition, and patient's request to send the discharge summary when patient leaves.

## 2017-11-14 NOTE — PROGRESS NOTE ADULT - PROBLEM SELECTOR PLAN 5
limited left leg US in ED negative for DVT. Check official US leg b/l  Check TTE  Continue lasix 20mg daily in setting of active malignancy  LE US at bedside negative for DVT in ED

## 2017-11-14 NOTE — PROGRESS NOTE ADULT - PROBLEM SELECTOR PLAN 8
per patient has 3 more days to complete a 7 day course of levaquin for UTI  Prior Urine culture from 10/31 with Ecoli resistant to levaquin, cipro, and ampicillin  UA negative hold off further antibiotics for now Lovenox  low sodium diet

## 2017-11-15 ENCOUNTER — TRANSCRIPTION ENCOUNTER (OUTPATIENT)
Age: 57
End: 2017-11-15

## 2017-11-15 VITALS
RESPIRATION RATE: 20 BRPM | OXYGEN SATURATION: 95 % | DIASTOLIC BLOOD PRESSURE: 87 MMHG | HEART RATE: 105 BPM | SYSTOLIC BLOOD PRESSURE: 143 MMHG

## 2017-11-15 DIAGNOSIS — R00.0 TACHYCARDIA, UNSPECIFIED: ICD-10-CM

## 2017-11-15 LAB
BACTERIA SPT RESP CULT: SIGNIFICANT CHANGE UP
BUN SERPL-MCNC: 11 MG/DL — SIGNIFICANT CHANGE UP (ref 7–23)
CALCIUM SERPL-MCNC: 9.3 MG/DL — SIGNIFICANT CHANGE UP (ref 8.4–10.5)
CHLORIDE SERPL-SCNC: 92 MMOL/L — LOW (ref 98–107)
CO2 SERPL-SCNC: 43 MMOL/L — HIGH (ref 22–31)
CREAT SERPL-MCNC: 0.4 MG/DL — LOW (ref 0.5–1.3)
GLUCOSE SERPL-MCNC: 106 MG/DL — HIGH (ref 70–99)
HCT VFR BLD CALC: 36.4 % — SIGNIFICANT CHANGE UP (ref 34.5–45)
HGB BLD-MCNC: 10.2 G/DL — LOW (ref 11.5–15.5)
MAGNESIUM SERPL-MCNC: 1.6 MG/DL — SIGNIFICANT CHANGE UP (ref 1.6–2.6)
MCHC RBC-ENTMCNC: 24.5 PG — LOW (ref 27–34)
MCHC RBC-ENTMCNC: 28 % — LOW (ref 32–36)
MCV RBC AUTO: 87.3 FL — SIGNIFICANT CHANGE UP (ref 80–100)
NRBC # FLD: 0 — SIGNIFICANT CHANGE UP
PHOSPHATE SERPL-MCNC: 2.9 MG/DL — SIGNIFICANT CHANGE UP (ref 2.5–4.5)
PLATELET # BLD AUTO: 332 K/UL — SIGNIFICANT CHANGE UP (ref 150–400)
PMV BLD: 9.9 FL — SIGNIFICANT CHANGE UP (ref 7–13)
POTASSIUM SERPL-MCNC: 4.1 MMOL/L — SIGNIFICANT CHANGE UP (ref 3.5–5.3)
POTASSIUM SERPL-SCNC: 4.1 MMOL/L — SIGNIFICANT CHANGE UP (ref 3.5–5.3)
RBC # BLD: 4.17 M/UL — SIGNIFICANT CHANGE UP (ref 3.8–5.2)
RBC # FLD: 21 % — HIGH (ref 10.3–14.5)
SODIUM SERPL-SCNC: 144 MMOL/L — SIGNIFICANT CHANGE UP (ref 135–145)
WBC # BLD: 11.68 K/UL — HIGH (ref 3.8–10.5)
WBC # FLD AUTO: 11.68 K/UL — HIGH (ref 3.8–10.5)

## 2017-11-15 PROCEDURE — 99238 HOSP IP/OBS DSCHRG MGMT 30/<: CPT

## 2017-11-15 RX ORDER — SENNA PLUS 8.6 MG/1
2 TABLET ORAL
Qty: 60 | Refills: 0 | OUTPATIENT
Start: 2017-11-15 | End: 2017-12-15

## 2017-11-15 RX ORDER — METOPROLOL TARTRATE 50 MG
1 TABLET ORAL
Qty: 60 | Refills: 0 | OUTPATIENT
Start: 2017-11-15 | End: 2017-12-15

## 2017-11-15 RX ORDER — DILTIAZEM HCL 120 MG
1 CAPSULE, EXT RELEASE 24 HR ORAL
Qty: 120 | Refills: 0 | OUTPATIENT
Start: 2017-11-15 | End: 2017-12-15

## 2017-11-15 RX ORDER — SODIUM CHLORIDE 0.65 %
2 AEROSOL, SPRAY (ML) NASAL
Qty: 1 | Refills: 0 | OUTPATIENT
Start: 2017-11-15 | End: 2017-12-15

## 2017-11-15 RX ORDER — MORPHINE SULFATE 50 MG/1
2.5 CAPSULE, EXTENDED RELEASE ORAL
Qty: 0 | Refills: 0 | Status: DISCONTINUED | OUTPATIENT
Start: 2017-11-15 | End: 2017-11-15

## 2017-11-15 RX ORDER — MORPHINE SULFATE 50 MG/1
1.25 CAPSULE, EXTENDED RELEASE ORAL
Qty: 105 | Refills: 0 | OUTPATIENT
Start: 2017-11-15 | End: 2017-11-29

## 2017-11-15 RX ADMIN — Medication 81 MILLIGRAM(S): at 11:25

## 2017-11-15 RX ADMIN — Medication 500 MICROGRAM(S): at 03:45

## 2017-11-15 NOTE — PROGRESS NOTE ADULT - PROBLEM SELECTOR PLAN 1
likely due to worsening lung metastases with poor lung reserve volume  F/u ABG  Continue bronchodilators as needed  Can get morphine for dyspnea - likely due to worsening lung metastases with poor lung reserve volume  - supplemental oxygen as needed

## 2017-11-15 NOTE — DISCHARGE NOTE ADULT - HOSPITAL COURSE
57F PMH colon cancer with mets to liver and lungs, on home O2, currently being treated at [facility], presented to ED with worsening SOB and chest tightness.  Found to be in acute hypercapnic and hypoxic respiratory failure. Also tachycardic. CT angio neg for PE. LE doppler neg for DVT.  RVP neg.  Treated with BIPAP with improvement of symptoms. ABG____. Continued on supplemental O2.  Atrial tachycardia, likely 2/2 respiratory disease burden  treated with diltiazem and metoprolol with improvement  Oncology consulted for metastatic colon cancer. Plan for pt to continue treatment with her outpt oncologists upon discharge. 57F PMH colon cancer with mets to liver and lungs, on home O2, currently being treated at [facility], presented to ED with worsening SOB and chest tightness.  Found to be in acute hypercapnic and hypoxic respiratory failure. Also tachycardic. CT angio neg for PE. LE doppler neg for DVT.  RVP neg.  Treated with BIPAP with improvement of symptoms. Continued on supplemental O2.  Atrial tachycardia, likely 2/2 respiratory disease burden  treated with diltiazem and metoprolol with improvement  Oncology consulted for metastatic colon cancer. Plan for pt to continue treatment with her outpt oncologists upon discharge.  Palliative consulted for symptom relief. Pt discharged with morphine.    dispo: to home

## 2017-11-15 NOTE — DISCHARGE NOTE ADULT - CARE PLAN
Principal Discharge DX:	Acute on chronic respiratory failure with hypoxia and hypercapnia  Goal:	Optimal respiratory function  Instructions for follow-up, activity and diet:	Likely due to burden of metastatic disease  Continue supplemental oxygen as needed  BIPAP_______  Secondary Diagnosis:	Colon cancer metastasized to multiple sites  Goal:	Continued treatment  Instructions for follow-up, activity and diet:	Follow up at [facility] to continue chemotherapy  Secondary Diagnosis:	Tachycardia  Goal:	Heart rate within normal limits  Instructions for follow-up, activity and diet:	Continue to take cardizem and metoprolol  Do not skip doses  Notify your practitioner if you have chest pain, palpitations or worsening SOB  Secondary Diagnosis:	Abdominal distension  Instructions for follow-up, activity and diet:	Due to metastatic disease  Follow up with your oncologist for continued care  Notify your practitioner if abdominal distention or shortness of breath worsens Principal Discharge DX:	Acute on chronic respiratory failure with hypoxia and hypercapnia  Goal:	Optimal respiratory function  Instructions for follow-up, activity and diet:	Likely due to burden of metastatic disease.  Continue supplemental oxygen as needed.  Continue morphine for symptomatic relief.  Follow up with oncology.  Secondary Diagnosis:	Colon cancer metastasized to multiple sites  Goal:	Continued treatment  Instructions for follow-up, activity and diet:	Follow up at [facility] to continue chemotherapy.  Secondary Diagnosis:	Tachycardia  Goal:	Heart rate within normal limits  Instructions for follow-up, activity and diet:	Continue to take cardizem and metoprolol.  Do not skip doses.  Notify your practitioner if you have chest pain, palpitations or worsening SOB.  Secondary Diagnosis:	Abdominal distension  Instructions for follow-up, activity and diet:	Due to metastatic disease.  Follow up with your oncologist for continued care.  Notify your practitioner if abdominal distention or shortness of breath worsens.

## 2017-11-15 NOTE — PROVIDER CONTACT NOTE (OTHER) - ASSESSMENT
pt asymptomatic, sitting in chair at bedside, BP stable, in no distress
Patient noted with /100. She is asymptomatic, no complaints of dizziness or headache voiced. Patient continues to sleep in bed and states its due to her cancer. Continual support given.
Patient tachycardic, other vital signs stable, Patient feeling palpitations

## 2017-11-15 NOTE — DISCHARGE NOTE ADULT - PLAN OF CARE
Optimal respiratory function Likely due to burden of metastatic disease  Continue supplemental oxygen as needed  BIPAP_______ Continued treatment Follow up at [facility] to continue chemotherapy Heart rate within normal limits Continue to take cardizem and metoprolol  Do not skip doses  Notify your practitioner if you have chest pain, palpitations or worsening SOB Due to metastatic disease  Follow up with your oncologist for continued care  Notify your practitioner if abdominal distention or shortness of breath worsens Likely due to burden of metastatic disease.  Continue supplemental oxygen as needed.  Continue morphine for symptomatic relief.  Follow up with oncology. Follow up at [facility] to continue chemotherapy. Continue to take cardizem and metoprolol.  Do not skip doses.  Notify your practitioner if you have chest pain, palpitations or worsening SOB. Due to metastatic disease.  Follow up with your oncologist for continued care.  Notify your practitioner if abdominal distention or shortness of breath worsens.

## 2017-11-15 NOTE — PROGRESS NOTE ADULT - PROBLEM SELECTOR PROBLEM 1
Acute on chronic respiratory failure with hypoxia and hypercapnia

## 2017-11-15 NOTE — PROGRESS NOTE ADULT - NEUROLOGICAL
detailed exam
Alert & oriented; no sensory, motor or coordination deficits, normal reflexes
detailed exam

## 2017-11-15 NOTE — PROGRESS NOTE ADULT - ATTENDING COMMENTS
Met colon ca with extensive pulmonary mets.  Today has no acute complaints.  Baseline dyspnea, but refused opiate options .  No sign of infection, not on abx.  Pt wishes to re-start her chemo today.  No acute need for hospitalization.  Can d/c home, she will f/u with her onc.
afebrile, wbc improved, no new symptoms.  stable for discharge, outpt f/u with her oncologist  pt requesting morphine prn for discharge.  palliative f/u appreciated
met colon cancer, extensive pulmonary mets.  sob. accepted a dose of morphine early, helped the dyspnea, but feels "groggy," doesn't want another.  slight increase in wbc. afebrile. cx negative.  tachycardia. just had a neg ctpa  refuses cardizem or lopressor dose  d/c plan for tomorrow
Agree with above. Clinically much improved today - less tachycardic, HR in the 120s, less tachypneic. Wore Bilevel overnight. ABG improved to 7.37/77/182/39/99%. Sitting up in chair on nasal cannula.  Acute on chronic hypercapneic and hypoxemic respiratory failure - likely from extensive tumor burden in her lungs.  Improving with NIPPV - will continue with this during the day when resting and nocturnally. Will start Prednisone 20 mg QD to see if this helps reduce any inflammation for bronchial compression or endobronchial obstruction from lung mets (though I do believe this is mostly mechanical from high tumor burden taking up the majority of her lung fields). Hold off on antibiotics for now.  Awaiting oncology follow up - daughter to bring in oral chemotherapy the patient was to initiate tomorrow.  DVT ppx

## 2017-11-15 NOTE — DISCHARGE NOTE ADULT - PATIENT PORTAL LINK FT
“You can access the [facility] Patient Portal, offered by [facility], by registering with the following website: [email or web address]”

## 2017-11-15 NOTE — PROGRESS NOTE ADULT - CARDIOVASCULAR DETAILS
positive S2/tachycardia/positive S1
tachycardia
regular rhythm, baseline HR around 110/tachycardia
tachycardia/positive S2/positive S1

## 2017-11-15 NOTE — DISCHARGE NOTE ADULT - MEDICATION SUMMARY - MEDICATIONS TO STOP TAKING
I will STOP taking the medications listed below when I get home from the hospital:    Levaquin 750 mg oral tablet  -- 1 tab(s) by mouth every 24 hours

## 2017-11-15 NOTE — PROGRESS NOTE ADULT - PROBLEM SELECTOR PLAN 3
Mets to lungs and liver  Per patient plan to start new regimen on discharge, follows at [facility]  Last chemo on 9/22  Spoke to MITCH Adams at [facility], who works with Dr. Lopez. Discussed current condition, and patient's request to send the discharge summary when patient leaves. - with mets to lung an liver  - pt follows at [facility], last chemo on 9/22  - will continue follow up upon discharge for further chemo

## 2017-11-15 NOTE — PROGRESS NOTE ADULT - RS GEN PE MLT RESP DETAILS PC
breath sounds equal/diminished breath sounds, L/airway patent/diminished breath sounds, R/respirations labored
diminished breath sounds, R/diminished breath sounds, L/airway patent
airway patent/diminished breath sounds, L/breath sounds equal/diminished breath sounds, R/rhonchi
respirations non-labored/Coarse breath sounds at bases, CORRAL

## 2017-11-15 NOTE — PROGRESS NOTE ADULT - PROBLEM SELECTOR PLAN 2
EKG--Atrial tachycardia  Likely 2/2 underlying lung disease burden  Will increase Cardizem to 60 mg every 6 hours and add metoprolol  Monitor BP tolerance - stable  - likely 2/2 underlying lung disease burden  - cont cardizem and metoprolol

## 2017-11-15 NOTE — DISCHARGE NOTE ADULT - MEDICATION SUMMARY - MEDICATIONS TO TAKE
I will START or STAY ON the medications listed below when I get home from the hospital:    predniSONE 5 mg oral tablet  -- 1 tab(s) by mouth once a day  -- Indication: For Acute on chronic respiratory failure with hypoxia and hypercapnia    aspirin 81 mg oral tablet, chewable  -- 1 tab(s) by mouth once a day  -- Indication: For Hypercoagulable state    morphine 10 mg/5 mL oral solution  -- 1.25 milliliter(s) by mouth every 4 hours, As Needed -SOB/dyspnea MDD:7.5mL  -- Indication: For SOB (shortness of breath)    dilTIAZem 60 mg oral tablet  -- 1 tab(s) by mouth 4 times a day  -- Indication: For Tachycardia    metoprolol tartrate 25 mg oral tablet  -- 1 tab(s) by mouth 2 times a day  -- Indication: For Tachycardia    Ventolin HFA 90 mcg/inh inhalation aerosol  -- 2 puff(s) inhaled 4 times a day, As Needed  -- Indication: For Asthma with acute exacerbation, unspecified asthma severity, unspecified whether persistent    furosemide 20 mg oral tablet  -- 1 tab(s) by mouth once a day  -- Indication: For Leg swelling    senna oral tablet  -- 2 tab(s) by mouth once a day (at bedtime), As needed, Constipation  -- Indication: For Constipation    sodium chloride 0.65% nasal spray  -- 2 spray(s) into nose 4 times a day, As Needed   -- Indication: For Congestion    hydrocodone-homatropine 5 mg-1.5 mg/5 mL oral syrup  -- 5 milliliter(s) by mouth every 6 hours, As needed, Cough MDD:20mL  -- Indication: For Cough

## 2017-11-15 NOTE — PROVIDER CONTACT NOTE (OTHER) - ACTION/TREATMENT ORDERED:
MD made aware, ECG ordered & performed, (tech diff. w/ ECG machine) unable to obtain adequate ECG, pt refusing another ECG,pt also refusing IVP metoprolol as well as PO, MD aware, will cont to monitor
Give cardizem early, continue to monitor.
NP notified, continue to monitor

## 2017-11-15 NOTE — PROGRESS NOTE ADULT - SUBJECTIVE AND OBJECTIVE BOX
CHIEF COMPLAINT:    Interval Events:    REVIEW OF SYSTEMS:  Constitutional:   Eyes:  ENT:  CV:  Resp:  GI:  :  MSK:  Integumentary:  Neurological:  Psychiatric:  Endocrine:  Hematologic/Lymphatic:  Allergic/Immunologic:  [ ] All other systems negative  [ ] Unable to assess ROS because ________    OBJECTIVE:  ICU Vital Signs Last 24 Hrs  T(C): 36.7 (15 Nov 2017 05:00), Max: 36.9 (14 Nov 2017 21:31)  T(F): 98 (15 Nov 2017 05:00), Max: 98.4 (14 Nov 2017 21:31)  HR: 112 (15 Nov 2017 06:30) (103 - 155)  BP: 124/78 (15 Nov 2017 05:00) (124/78 - 159/94)  BP(mean): --  ABP: --  ABP(mean): --  RR: 20 (15 Nov 2017 05:00) (20 - 22)  SpO2: 90% (15 Nov 2017 06:30) (89% - 99%)        CAPILLARY BLOOD GLUCOSE          PHYSICAL EXAM:  General:   HEENT:   Lymph Nodes:  Neck:   Respiratory:   Cardiovascular:   Abdomen:   Extremities:   Skin:   Neurological:  Psychiatry:    HOSPITAL MEDICATIONS:  MEDICATIONS  (STANDING):  aspirin  chewable 81 milliGRAM(s) Oral daily  diltiazem    Tablet 60 milliGRAM(s) Oral four times a day  enoxaparin Injectable 40 milliGRAM(s) SubCutaneous every 24 hours  influenza   Vaccine 0.5 milliLiter(s) IntraMuscular once  ipratropium    for Nebulization 500 MICROGram(s) Nebulizer every 6 hours  metoprolol     tartrate 25 milliGRAM(s) Oral two times a day  predniSONE   Tablet 5 milliGRAM(s) Oral daily    MEDICATIONS  (PRN):  acetaminophen   Tablet. 650 milliGRAM(s) Oral every 6 hours PRN Moderate Pain (4 - 6)  senna 2 Tablet(s) Oral at bedtime PRN Constipation  sodium chloride 0.65% Nasal 1 Spray(s) Both Nostrils every 6 hours PRN Nasal Congestion      LABS:                        10.2   11.68 )-----------( 332      ( 15 Nov 2017 06:00 )             36.4     11-15    144  |  92<L>  |  11  ----------------------------<  106<H>  4.1   |  43<H>  |  0.40<L>    Ca    9.3      15 Nov 2017 06:00  Phos  2.9     11-15  Mg     1.6     11-15                MICROBIOLOGY:     RADIOLOGY:  [ ] Reviewed and interpreted by me    PULMONARY FUNCTION TESTS:    EKG: CHIEF COMPLAINT: Patient is a 57y old  Female who presents with a chief complaint of chest tightness, worsening SOB (15 Nov 2017 05:39)    Interval Events: none overnight      REVIEW OF SYSTEMS:  Constitutional: states unable to sleep 2/2 cough  CV: denies  Resp: denies  GI: denies  [x] All other systems negative  [ ] Unable to assess ROS because ________      OBJECTIVE:  ICU Vital Signs Last 24 Hrs  T(C): 36.7 (15 Nov 2017 05:00), Max: 36.9 (14 Nov 2017 21:31)  T(F): 98 (15 Nov 2017 05:00), Max: 98.4 (14 Nov 2017 21:31)  HR: 112 (15 Nov 2017 06:30) (103 - 155)  BP: 124/78 (15 Nov 2017 05:00) (124/78 - 159/94)  BP(mean): --  ABP: --  ABP(mean): --  RR: 20 (15 Nov 2017 05:00) (20 - 22)  SpO2: 90% (15 Nov 2017 06:30) (89% - 99%)      HOSPITAL MEDICATIONS:  MEDICATIONS  (STANDING):  aspirin  chewable 81 milliGRAM(s) Oral daily  diltiazem    Tablet 60 milliGRAM(s) Oral four times a day  enoxaparin Injectable 40 milliGRAM(s) SubCutaneous every 24 hours  influenza   Vaccine 0.5 milliLiter(s) IntraMuscular once  ipratropium    for Nebulization 500 MICROGram(s) Nebulizer every 6 hours  metoprolol     tartrate 25 milliGRAM(s) Oral two times a day  predniSONE   Tablet 5 milliGRAM(s) Oral daily    MEDICATIONS  (PRN):  acetaminophen   Tablet. 650 milliGRAM(s) Oral every 6 hours PRN Moderate Pain (4 - 6)  senna 2 Tablet(s) Oral at bedtime PRN Constipation  sodium chloride 0.65% Nasal 1 Spray(s) Both Nostrils every 6 hours PRN Nasal Congestion      LABS:                        10.2   11.68 )-----------( 332      ( 15 Nov 2017 06:00 )             36.4     11-15    144  |  92<L>  |  11  ----------------------------<  106<H>  4.1   |  43<H>  |  0.40<L>    Ca    9.3      15 Nov 2017 06:00  Phos  2.9     11-15  Mg     1.6     11-15

## 2017-11-15 NOTE — PROGRESS NOTE ADULT - NEUROLOGICAL DETAILS
responds to pain/alert and oriented x 3/responds to verbal commands
normal strength/alert and oriented x 3

## 2017-11-16 LAB
BACTERIA BLD CULT: SIGNIFICANT CHANGE UP
BACTERIA BLD CULT: SIGNIFICANT CHANGE UP

## 2017-11-18 ENCOUNTER — INPATIENT (INPATIENT)
Facility: HOSPITAL | Age: 57
LOS: 4 days | End: 2017-11-23
Attending: INTERNAL MEDICINE | Admitting: INTERNAL MEDICINE
Payer: COMMERCIAL

## 2017-11-18 VITALS
RESPIRATION RATE: 22 BRPM | TEMPERATURE: 99 F | HEART RATE: 135 BPM | SYSTOLIC BLOOD PRESSURE: 126 MMHG | DIASTOLIC BLOOD PRESSURE: 78 MMHG | OXYGEN SATURATION: 96 %

## 2017-11-18 DIAGNOSIS — Z87.59 PERSONAL HISTORY OF OTHER COMPLICATIONS OF PREGNANCY, CHILDBIRTH AND THE PUERPERIUM: Chronic | ICD-10-CM

## 2017-11-18 DIAGNOSIS — J96.22 ACUTE AND CHRONIC RESPIRATORY FAILURE WITH HYPERCAPNIA: ICD-10-CM

## 2017-11-18 DIAGNOSIS — Z98.89 OTHER SPECIFIED POSTPROCEDURAL STATES: Chronic | ICD-10-CM

## 2017-11-18 LAB
ALBUMIN SERPL ELPH-MCNC: 3 G/DL — LOW (ref 3.3–5)
ALP SERPL-CCNC: 289 U/L — HIGH (ref 40–120)
ALT FLD-CCNC: 29 U/L — SIGNIFICANT CHANGE UP (ref 4–33)
AST SERPL-CCNC: 67 U/L — HIGH (ref 4–32)
BASE EXCESS BLDV CALC-SCNC: 14.6 MMOL/L — SIGNIFICANT CHANGE UP
BASE EXCESS BLDV CALC-SCNC: 15.8 MMOL/L — SIGNIFICANT CHANGE UP
BASE EXCESS BLDV CALC-SCNC: 18.3 MMOL/L — SIGNIFICANT CHANGE UP
BASOPHILS # BLD AUTO: 0.01 K/UL — SIGNIFICANT CHANGE UP (ref 0–0.2)
BASOPHILS NFR BLD AUTO: 0.1 % — SIGNIFICANT CHANGE UP (ref 0–2)
BASOPHILS NFR SPEC: 0 % — SIGNIFICANT CHANGE UP (ref 0–2)
BILIRUB SERPL-MCNC: 0.6 MG/DL — SIGNIFICANT CHANGE UP (ref 0.2–1.2)
BLOOD GAS VENOUS - CREATININE: 0.6 MG/DL — SIGNIFICANT CHANGE UP (ref 0.5–1.3)
BLOOD GAS VENOUS - CREATININE: < 0.36 MG/DL — LOW (ref 0.5–1.3)
BLOOD GAS VENOUS - CREATININE: < 0.36 MG/DL — LOW (ref 0.5–1.3)
BUN SERPL-MCNC: 28 MG/DL — HIGH (ref 7–23)
CALCIUM SERPL-MCNC: 9.7 MG/DL — SIGNIFICANT CHANGE UP (ref 8.4–10.5)
CHLORIDE BLDV-SCNC: 100 MMOL/L — SIGNIFICANT CHANGE UP (ref 96–108)
CHLORIDE BLDV-SCNC: 98 MMOL/L — SIGNIFICANT CHANGE UP (ref 96–108)
CHLORIDE BLDV-SCNC: 99 MMOL/L — SIGNIFICANT CHANGE UP (ref 96–108)
CHLORIDE SERPL-SCNC: 95 MMOL/L — LOW (ref 98–107)
CK MB BLD-MCNC: 2.66 NG/ML — SIGNIFICANT CHANGE UP (ref 1–4.7)
CK MB BLD-MCNC: SIGNIFICANT CHANGE UP (ref 0–2.5)
CK SERPL-CCNC: 93 U/L — SIGNIFICANT CHANGE UP (ref 25–170)
CO2 SERPL-SCNC: 37 MMOL/L — HIGH (ref 22–31)
CREAT SERPL-MCNC: 0.68 MG/DL — SIGNIFICANT CHANGE UP (ref 0.5–1.3)
EOSINOPHIL # BLD AUTO: 0.06 K/UL — SIGNIFICANT CHANGE UP (ref 0–0.5)
EOSINOPHIL NFR BLD AUTO: 0.5 % — SIGNIFICANT CHANGE UP (ref 0–6)
EOSINOPHIL NFR FLD: 0 % — SIGNIFICANT CHANGE UP (ref 0–6)
GAS PNL BLDV: 142 MMOL/L — SIGNIFICANT CHANGE UP (ref 136–146)
GAS PNL BLDV: 142 MMOL/L — SIGNIFICANT CHANGE UP (ref 136–146)
GAS PNL BLDV: 143 MMOL/L — SIGNIFICANT CHANGE UP (ref 136–146)
GLUCOSE BLDV-MCNC: 108 — HIGH (ref 70–99)
GLUCOSE BLDV-MCNC: 114 — HIGH (ref 70–99)
GLUCOSE BLDV-MCNC: 120 — HIGH (ref 70–99)
GLUCOSE SERPL-MCNC: 117 MG/DL — HIGH (ref 70–99)
HCO3 BLDV-SCNC: 35 MMOL/L — HIGH (ref 20–27)
HCO3 BLDV-SCNC: 37 MMOL/L — HIGH (ref 20–27)
HCO3 BLDV-SCNC: 39 MMOL/L — HIGH (ref 20–27)
HCT VFR BLD CALC: 38.1 % — SIGNIFICANT CHANGE UP (ref 34.5–45)
HCT VFR BLDV CALC: 30.9 % — LOW (ref 34.5–45)
HCT VFR BLDV CALC: 32.5 % — LOW (ref 34.5–45)
HCT VFR BLDV CALC: 34.5 % — SIGNIFICANT CHANGE UP (ref 34.5–45)
HGB BLD-MCNC: 10.6 G/DL — LOW (ref 11.5–15.5)
HGB BLDV-MCNC: 10 G/DL — LOW (ref 11.5–15.5)
HGB BLDV-MCNC: 10.5 G/DL — LOW (ref 11.5–15.5)
HGB BLDV-MCNC: 11.2 G/DL — LOW (ref 11.5–15.5)
HYPOCHROMIA BLD QL: SLIGHT — SIGNIFICANT CHANGE UP
IMM GRANULOCYTES # BLD AUTO: 0.09 # — SIGNIFICANT CHANGE UP
IMM GRANULOCYTES NFR BLD AUTO: 0.7 % — SIGNIFICANT CHANGE UP (ref 0–1.5)
LACTATE BLDV-MCNC: 2.2 MMOL/L — HIGH (ref 0.5–2)
LACTATE BLDV-MCNC: 2.5 MMOL/L — HIGH (ref 0.5–2)
LACTATE BLDV-MCNC: 2.7 MMOL/L — HIGH (ref 0.5–2)
LYMPHOCYTES # BLD AUTO: 0.72 K/UL — LOW (ref 1–3.3)
LYMPHOCYTES # BLD AUTO: 5.6 % — LOW (ref 13–44)
LYMPHOCYTES NFR SPEC AUTO: 4 % — LOW (ref 13–44)
MACROCYTES BLD QL: SLIGHT — SIGNIFICANT CHANGE UP
MANUAL SMEAR VERIFICATION: SIGNIFICANT CHANGE UP
MCHC RBC-ENTMCNC: 24.9 PG — LOW (ref 27–34)
MCHC RBC-ENTMCNC: 27.8 % — LOW (ref 32–36)
MCV RBC AUTO: 89.4 FL — SIGNIFICANT CHANGE UP (ref 80–100)
MONOCYTES # BLD AUTO: 1.75 K/UL — HIGH (ref 0–0.9)
MONOCYTES NFR BLD AUTO: 13.6 % — SIGNIFICANT CHANGE UP (ref 2–14)
MONOCYTES NFR BLD: 19 % — HIGH (ref 2–9)
NEUTROPHIL AB SER-ACNC: 77 % — SIGNIFICANT CHANGE UP (ref 43–77)
NEUTROPHILS # BLD AUTO: 10.22 K/UL — HIGH (ref 1.8–7.4)
NEUTROPHILS NFR BLD AUTO: 79.5 % — HIGH (ref 43–77)
NRBC # FLD: 0.05 — SIGNIFICANT CHANGE UP
NT-PROBNP SERPL-SCNC: SIGNIFICANT CHANGE UP PG/ML
PCO2 BLDV: 109 MMHG — CRITICAL HIGH (ref 41–51)
PCO2 BLDV: > 110 MMHG — CRITICAL HIGH (ref 41–51)
PCO2 BLDV: > 110 MMHG — CRITICAL HIGH (ref 41–51)
PH BLDV: 7.18 PH — CRITICAL LOW (ref 7.32–7.43)
PH BLDV: 7.21 PH — LOW (ref 7.32–7.43)
PH BLDV: 7.23 PH — LOW (ref 7.32–7.43)
PLATELET # BLD AUTO: 441 K/UL — HIGH (ref 150–400)
PLATELET COUNT - ESTIMATE: NORMAL — SIGNIFICANT CHANGE UP
PMV BLD: 9.6 FL — SIGNIFICANT CHANGE UP (ref 7–13)
PO2 BLDV: 50 MMHG — HIGH (ref 35–40)
PO2 BLDV: 54 MMHG — HIGH (ref 35–40)
PO2 BLDV: 70 MMHG — HIGH (ref 35–40)
POLYCHROMASIA BLD QL SMEAR: SLIGHT — SIGNIFICANT CHANGE UP
POTASSIUM BLDV-SCNC: 4.2 MMOL/L — SIGNIFICANT CHANGE UP (ref 3.4–4.5)
POTASSIUM BLDV-SCNC: 4.4 MMOL/L — SIGNIFICANT CHANGE UP (ref 3.4–4.5)
POTASSIUM BLDV-SCNC: 4.5 MMOL/L — SIGNIFICANT CHANGE UP (ref 3.4–4.5)
POTASSIUM SERPL-MCNC: 5.8 MMOL/L — HIGH (ref 3.5–5.3)
POTASSIUM SERPL-SCNC: 5.8 MMOL/L — HIGH (ref 3.5–5.3)
PROT SERPL-MCNC: 7.5 G/DL — SIGNIFICANT CHANGE UP (ref 6–8.3)
RBC # BLD: 4.26 M/UL — SIGNIFICANT CHANGE UP (ref 3.8–5.2)
RBC # FLD: 22 % — HIGH (ref 10.3–14.5)
REVIEW TO FOLLOW: YES — SIGNIFICANT CHANGE UP
SAO2 % BLDV: 73.2 % — SIGNIFICANT CHANGE UP (ref 60–85)
SAO2 % BLDV: 79.2 % — SIGNIFICANT CHANGE UP (ref 60–85)
SAO2 % BLDV: 88.9 % — HIGH (ref 60–85)
SODIUM SERPL-SCNC: 144 MMOL/L — SIGNIFICANT CHANGE UP (ref 135–145)
TROPONIN T SERPL-MCNC: < 0.06 NG/ML — SIGNIFICANT CHANGE UP (ref 0–0.06)
WBC # BLD: 12.85 K/UL — HIGH (ref 3.8–10.5)
WBC # FLD AUTO: 12.85 K/UL — HIGH (ref 3.8–10.5)

## 2017-11-18 PROCEDURE — 71010: CPT | Mod: 26

## 2017-11-18 RX ORDER — SODIUM CHLORIDE 9 MG/ML
1000 INJECTION INTRAMUSCULAR; INTRAVENOUS; SUBCUTANEOUS ONCE
Qty: 0 | Refills: 0 | Status: COMPLETED | OUTPATIENT
Start: 2017-11-18 | End: 2017-11-18

## 2017-11-18 RX ADMIN — SODIUM CHLORIDE 1000 MILLILITER(S): 9 INJECTION INTRAMUSCULAR; INTRAVENOUS; SUBCUTANEOUS at 20:36

## 2017-11-18 NOTE — CONSULT NOTE ADULT - SUBJECTIVE AND OBJECTIVE BOX
CHIEF COMPLAINT:    HPI:  57F h/o metastatic colon cancer with mets to liver and lung (on home O2) here with hypercarbic respiratory failure.    Patient was recently admitted to the RCU -11/15 for acute hypoxic / hypercarbic respiratory failure.  CTA negative for PE.  RVP negative.  The patient was started on BiPAP with       improvement of her symptoms.  She was also noted to be in atrial tachycardia and was treated with diltiazem and metoprolol.  The patient was brought here today by her daughter, as she was "in and     out" and very sleepy since ~3pm.   Her daughter states that she fed the patient some food around 2-3pm but then after that she began to come in and out of consciousness and was very sleepy.  She     denies cough, fever, chills.    In the ED, Tmax 99.4F, -136, -132/78-87, SpO2 98% on BiPAP.  Patient noted to be hypoxic to 85% in the chart.  WBC 12.85, Hgb 10.6, plt 441, K 5.8, Cr 0.68, AST 67, ALT       29, alk phos 289, BNP 61439, Marielena negative x1.  VBG with pH 7.23, lactate 2.5, CO2 109 (noted to be in 70s-100s during last admission).    Spoke with patient daughter and HCP, Tasha ([phone]).  Tasha states that she had discussed ventilation with her mother and that her mother would not want to be intubated.  Tasha     agrees with this decision.  However, she states that her mother would want chest compressions.    PAST MEDICAL & SURGICAL HISTORY:  Colon cancer metastasized to multiple sites: liver and lung  Asthma  Anemia  History of 3  sections  H/O coronary angiogram:       FAMILY HISTORY:  No pertinent family history in first degree relatives      SOCIAL HISTORY:  Smoking: [ ] Never Smoked [ ] Former Smoker (__ packs x ___ years) [ ] Current Smoker  (__ packs x ___ years)  Substance Use: [ ] Never Used [ ] Used ____  EtOH Use:  Marital Status: [ ] Single [ ]  [ ]  [ ]   Sexual History:   Occupation:  Recent Travel:  Country of Birth:  Advance Directives: pt would not want intubation, but would want chest compressions    Allergies    No Known Allergies    Intolerances        HOME MEDICATIONS:    REVIEW OF SYSTEMS:  Constitutional: [ ] fevers [ ] chills [ ] weight loss [ ] weight gain  HEENT: [ ] dry eyes [ ] eye irritation [ ] postnasal drip [ ] nasal congestion  CV: [ ] chest pain [ ] orthopnea [ ] palpitations [ ] murmur  Resp: [ ] cough [ ] shortness of breath [ ] dyspnea [ ] wheezing [ ] sputum [ ] hemoptysis  GI: [ ] nausea [ ] vomiting [ ] diarrhea [ ] constipation [ ] abd pain [ ] dysphagia   : [ ] dysuria [ ] nocturia [ ] hematuria [ ] increased urinary frequency  Musculoskeletal: [ ] back pain [ ] myalgias [ ] arthralgias [ ] fracture  Skin: [ ] rash [ ] itch  Neurological: [ ] headache [ ] dizziness [ ] syncope [ ] weakness [ ] numbness  Psychiatric: [ ] anxiety [ ] depression  Endocrine: [ ] diabetes [ ] thyroid problem  Hematologic/Lymphatic: [ ] anemia [ ] bleeding problem  Allergic/Immunologic: [ ] itchy eyes [ ] nasal discharge [ ] hives [ ] angioedema  [ ] All other systems negative  [ x ] Unable to assess ROS because of somnolence    OBJECTIVE:  ICU Vital Signs Last 24 Hrs  T(C): 37.4 (2017 20:38), Max: 37.4 (2017 20:38)  T(F): 99.4 (2017 20:38), Max: 99.4 (2017 20:38)  HR: 130 (2017 21:26) (130 - 136)  BP: 132/87 (2017 21:26) (121/78 - 132/87)  BP(mean): --  ABP: --  ABP(mean): --  RR: 26 (2017 21:26) (22 - 26)  SpO2: 98% (2017 21:26) (85% - 100%)        CAPILLARY BLOOD GLUCOSE          PHYSICAL EXAM:  General: arousable to voice, somnolent  HEENT: unable to assess mouth, BiPAP in place  Lymph Nodes: unable to assess  Neck: unable to assess  Respiratory: diffuse rales bilaterally at all lung fields  Cardiovascular: tachycardic, regular rhythm  Abdomen: distended, non-tender  Extremities: no LE edema  Skin: warm, dry  Neurological: arousable to voice but not following commands  Psychiatry: unable to assess    LINES:     HOSPITAL MEDICATIONS:  MEDICATIONS  (STANDING):    MEDICATIONS  (PRN):      LABS:                        10.6   12.85 )-----------( 441      ( 2017 20:30 )             38.1     Hgb Trend: 10.6<--, 10.2<--, 10.2<--, 10.4<--, 9.7<--      144  |  95<L>  |  28<H>  ----------------------------<  117<H>  5.8<H>   |  37<H>  |  0.68    Ca    9.7      2017 20:30    TPro  7.5  /  Alb  3.0<L>  /  TBili  0.6  /  DBili  x   /  AST  67<H>  /  ALT  29  /  AlkPhos  289<H>      Creatinine Trend: 0.68<--, 0.40<--, 0.38<--, 0.45<--, 0.46<--, 0.50<--        Venous Blood Gas:   @ 22:15  7.18/> 110/50/35/73.2  VBG Lactate: 2.2  Venous Blood Gas:   @ 21:15  7.23/109/54/37/79.2  VBG Lactate: 2.5  Venous Blood Gas:   @ 20:30  7.21/> 110/70/39/88.9  VBG Lactate: 2.7      MICROBIOLOGY:     RADIOLOGY:  [ x ] Reviewed and interpreted by me  CXR diffuse metastatic disease    EKG: CHIEF COMPLAINT:    HPI:  57F h/o metastatic colon cancer with mets to liver and lung (on home O2) here with hypercarbic respiratory failure.    Patient was recently admitted to the RCU -11/15 for acute hypoxic / hypercarbic respiratory failure.  CTA negative for PE.  RVP negative.  The patient was started on BiPAP with     improvement of her symptoms.  She was also noted to be in atrial tachycardia and was treated with diltiazem and metoprolol.  The patient was brought here today by her daughter, as she was "in and   out" and very sleepy since ~3pm.   Her daughter states that she fed the patient some food around 2-3pm but then after that she began to come in and out of consciousness and was very sleepy.  She   denies cough, fever, chills.    In the ED, Tmax 99.4F, -136, -132/78-87, SpO2 98% on BiPAP.  Patient noted to be hypoxic to 85% in the chart.  WBC 12.85, Hgb 10.6, plt 441, K 5.8, Cr 0.68, AST 67, ALT     29, alk phos 289, BNP 91197, Marielena negative x1.  VBG with pH 7.23, lactate 2.5, CO2 109 (noted to be in 70s-100s during last admission).    Spoke with patient daughter and HCP, Tasha ([phone]).  Tasha states that she had discussed ventilation with her mother and that her mother would not want to be intubated.  Tasha agrees with this decision.  However, she states that her mother would want chest compressions.    PAST MEDICAL & SURGICAL HISTORY:  Colon cancer metastasized to multiple sites: liver and lung  Asthma  Anemia  History of 3  sections  H/O coronary angiogram:       FAMILY HISTORY:  No pertinent family history in first degree relatives      SOCIAL HISTORY:  Smoking: [ ] Never Smoked [ ] Former Smoker (__ packs x ___ years) [ ] Current Smoker  (__ packs x ___ years)  Substance Use: [ ] Never Used [ ] Used ____  EtOH Use:  Marital Status: [ ] Single [ ]  [ ]  [ ]   Sexual History:   Occupation:  Recent Travel:  Country of Birth:  Advance Directives: pt would not want intubation, but would want chest compressions    Allergies    No Known Allergies    Intolerances        HOME MEDICATIONS:    REVIEW OF SYSTEMS:  Constitutional: [ ] fevers [ ] chills [ ] weight loss [ ] weight gain  HEENT: [ ] dry eyes [ ] eye irritation [ ] postnasal drip [ ] nasal congestion  CV: [ ] chest pain [ ] orthopnea [ ] palpitations [ ] murmur  Resp: [ ] cough [ ] shortness of breath [ ] dyspnea [ ] wheezing [ ] sputum [ ] hemoptysis  GI: [ ] nausea [ ] vomiting [ ] diarrhea [ ] constipation [ ] abd pain [ ] dysphagia   : [ ] dysuria [ ] nocturia [ ] hematuria [ ] increased urinary frequency  Musculoskeletal: [ ] back pain [ ] myalgias [ ] arthralgias [ ] fracture  Skin: [ ] rash [ ] itch  Neurological: [ ] headache [ ] dizziness [ ] syncope [ ] weakness [ ] numbness  Psychiatric: [ ] anxiety [ ] depression  Endocrine: [ ] diabetes [ ] thyroid problem  Hematologic/Lymphatic: [ ] anemia [ ] bleeding problem  Allergic/Immunologic: [ ] itchy eyes [ ] nasal discharge [ ] hives [ ] angioedema  [ ] All other systems negative  [ x ] Unable to assess ROS because of somnolence    OBJECTIVE:  ICU Vital Signs Last 24 Hrs  T(C): 37.4 (2017 20:38), Max: 37.4 (2017 20:38)  T(F): 99.4 (2017 20:38), Max: 99.4 (2017 20:38)  HR: 130 (2017 21:26) (130 - 136)  BP: 132/87 (2017 21:26) (121/78 - 132/87)  BP(mean): --  ABP: --  ABP(mean): --  RR: 26 (2017 21:26) (22 - 26)  SpO2: 98% (2017 21:26) (85% - 100%)        CAPILLARY BLOOD GLUCOSE          PHYSICAL EXAM:  General: arousable to voice, somnolent  HEENT: unable to assess mouth, BiPAP in place  Lymph Nodes: unable to assess  Neck: unable to assess  Respiratory: diffuse rales bilaterally at all lung fields  Cardiovascular: tachycardic, regular rhythm  Abdomen: distended, non-tender  Extremities: no LE edema  Skin: warm, dry  Neurological: arousable to voice but not following commands  Psychiatry: unable to assess    LINES:     HOSPITAL MEDICATIONS:  MEDICATIONS  (STANDING):    MEDICATIONS  (PRN):      LABS:                        10.6   12.85 )-----------( 441      ( 2017 20:30 )             38.1     Hgb Trend: 10.6<--, 10.2<--, 10.2<--, 10.4<--, 9.7<--      144  |  95<L>  |  28<H>  ----------------------------<  117<H>  5.8<H>   |  37<H>  |  0.68    Ca    9.7      2017 20:30    TPro  7.5  /  Alb  3.0<L>  /  TBili  0.6  /  DBili  x   /  AST  67<H>  /  ALT  29  /  AlkPhos  289<H>      Creatinine Trend: 0.68<--, 0.40<--, 0.38<--, 0.45<--, 0.46<--, 0.50<--        Venous Blood Gas:   @ 22:15  7.18/> 110/50/35/73.2  VBG Lactate: 2.2  Venous Blood Gas:   @ 21:15  7.23/109/54/37/79.2  VBG Lactate: 2.5  Venous Blood Gas:   @ 20:30  7.21/> 110/70/39/88.9  VBG Lactate: 2.7      MICROBIOLOGY:     RADIOLOGY:  [ x ] Reviewed and interpreted by me  CXR diffuse metastatic disease    EKG:

## 2017-11-18 NOTE — ED PROVIDER NOTE - OBJECTIVE STATEMENT
57F PMH colon cancer with mets to liver and lungs, on home O2, currently being treated at [facility], presenting with shortness of breath. Pt recently discharged for same complaint 3 days ago, returns with worsening shortness of breath and dizziness, occurring with exertion and at rest, associated with lethargy. 57F PMH colon cancer with mets to liver and lungs, on home O2, currently being treated at [facility], presenting with shortness of breath. Pt recently discharged for same complaint 3 days ago, returns with worsening shortness of breath and dizziness, occurring with exertion and at rest, associated with lethargy.  Ill appearing, though speaking in whole sentences.

## 2017-11-18 NOTE — ED ADULT NURSE NOTE - OBJECTIVE STATEMENT
Received pt in trauma A, pt appears lethargic Received pt in trauma A, pt appears lethargic and weak, A&Ox3, respirations even and labored b/l. Sinus tach on cardiac monitor. Abdomen hard, distended. IVL 18g Angiocath placed on left AC. Labs sent. Rectal 99.4F. Stage 2 pressure ulcer on sacrum 0.5cm x0.5cm. MD Daniels at bedside. Respiratory at bedside for bipap. Will continue to monitor. Received pt in trauma A, pt appears lethargic and weak, A&Ox3, respirations even and labored b/l. Sinus tach on cardiac monitor. Abdomen hard, distended. IVL 18g Angiocath placed on left AC. Labs sent. Rectal 99.4F. Stage 2 pressure ulcer on sacrum 0.5cm x0.5cm. Port noted on right upper chest wall. MD Daniels at bedside. Respiratory at bedside for bipap. Will continue to monitor.

## 2017-11-18 NOTE — ED PROVIDER NOTE - CRITICAL CARE PROVIDED
consult w/ pt's family directly relating to pts condition/additional history taking/documentation/interpretation of diagnostic studies/direct patient care (not related to procedure)/consultation with other physicians

## 2017-11-18 NOTE — ED PROVIDER NOTE - CARE PLAN
Principal Discharge DX:	Acute on chronic respiratory failure with hypercapnia  Secondary Diagnosis:	Colon cancer metastasized to multiple sites

## 2017-11-18 NOTE — ED ADULT NURSE NOTE - CHIEF COMPLAINT
The patient is a 57y Female complaining of SOB, dizziness since Wednesday. The patient is a 57y Female complaining of SOB, dizziness since Wednesday. Pt reports usually on 8L of O2 at home. The patient is a 57y Female complaining of SOB, dizziness since Wednesday. Pt reports usually on 8L of O2 at home. Pmhx colon ca mets to liver and lung.

## 2017-11-18 NOTE — ED ADULT TRIAGE NOTE - CHIEF COMPLAINT QUOTE
pt c/o dizziness and SOB since Wednesday. pt 88 % on RA. placed on 100% nrb by EMS. pt appears lethargic. ems states there is a possibly pt took extra oxycodone.  f/[id number]

## 2017-11-18 NOTE — CONSULT NOTE ADULT - ASSESSMENT
57F h/o metastatic colon cancer with mets to liver and lung (on home O2) here with hypercarbic respiratory failure.  Patient is saturating well on BiPAP and, per her HCP, would not want intubation.  Pt is currently not a candidate for the MICU but would benefit from a bed in the RCU.    #Neuro - somnolent, monitor for improvement on BiPAP    #CV - tachycardic, appears to be in atrial tachycardia, continue dilt and metop as indicated    #Resp - on BiPAP 13/5 currently, could increase to 14/6 as at last admission, repeat VBG / ABG to assess CO2 level    #GI - NPO while on BiPAP / somnolent    #Renal - no acute issues    #ID - no acute issues    #Endo - no acute issues    #Heme - leukocytosis in the setting of recent steroids and stable anemia    #Code Status - full code, but pt would not want intubation -- further discussions to be had with family    Isabel Anthony MD  PGY-2 | Internal Medicine  [phone] / [phone]  Spectra: [phone] 57F h/o metastatic colon cancer with mets to liver and lung (on home O2) here with hypercarbic respiratory failure.  Patient is saturating well on BiPAP and, per her HCP, would not want intubation.  Pt is currently not a candidate for the MICU but would benefit from a bed with bipap monitoring and comfort measures such as an advanced illness bed.  #Neuro - somnolent, monitor for improvement on BiPAP    #CV - tachycardic, appears to be in atrial tachycardia, continue dilt and metop as indicated    #Resp - on BiPAP 13/5 currently, could increase to 14/6 as at last admission, repeat VBG / ABG to assess CO2 level    #GI - NPO while on BiPAP / somnolent    #Renal - no acute issues    #ID - no acute issues    #Endo - no acute issues    #Heme - leukocytosis in the setting of recent steroids and stable anemia    #Code Status - full code, but pt would not want intubation -- further discussions to be had with family    Isabel Anthony MD  PGY-2 | Internal Medicine  [phone] / [phone]  Spectra: [phone]

## 2017-11-18 NOTE — CONSULT NOTE ADULT - ATTENDING COMMENTS
Patient seen and examined at bedside, pt with hx stage iv colon ca with   extensive mets to lungs and liver. Progressively getting worse in the last 3 weeks  with admissions for hypercapneic respiratory failure.  Currently on bipap and   per daughter reported not wishing to be intubated.  Clinically Prognosis is poor  and irreversible repsiratory failure.  Patient is DNI  Patient will not benefit from the icu.  Suggest an advanced illness  bed with comfort measures.   Critical care will not alter prognosis.

## 2017-11-18 NOTE — ED PROVIDER NOTE - MEDICAL DECISION MAKING DETAILS
57F w/ above history p/w dyspnea for past 3 days, recently admitted for acute on chronic hypercapnic resp failure 57F w/ above history p/w dyspnea for past 3 days, recently admitted for acute on chronic hypercapnic resp failure.  Ill appearing and in moderate resp distress.  Will provide supportive care, admit for further E&M.

## 2017-11-18 NOTE — ED PROVIDER NOTE - ATTENDING CONTRIBUTION TO CARE
Attending Attestation: Dr. Daniels  I have personally performed a history and physical examination of the patient and discussed management with the resident as well as the patient.  I reviewed the resident's note and agree with the documented findings and plan of care.  I have authored and modified critical sections of the Provider Note, including but not limited to HPI, Physical Exam and MDM.  57F w/ above history p/w dyspnea for past 3 days, recently admitted for acute on chronic hypercapnic resp failure.  Ill appearing and in moderate resp distress.  Will provide supportive care, admit for further E&M.

## 2017-11-19 DIAGNOSIS — J96.22 ACUTE AND CHRONIC RESPIRATORY FAILURE WITH HYPERCAPNIA: ICD-10-CM

## 2017-11-19 DIAGNOSIS — D72.829 ELEVATED WHITE BLOOD CELL COUNT, UNSPECIFIED: ICD-10-CM

## 2017-11-19 DIAGNOSIS — C18.9 MALIGNANT NEOPLASM OF COLON, UNSPECIFIED: ICD-10-CM

## 2017-11-19 DIAGNOSIS — Z71.89 OTHER SPECIFIED COUNSELING: ICD-10-CM

## 2017-11-19 DIAGNOSIS — R00.0 TACHYCARDIA, UNSPECIFIED: ICD-10-CM

## 2017-11-19 DIAGNOSIS — Z29.9 ENCOUNTER FOR PROPHYLACTIC MEASURES, UNSPECIFIED: ICD-10-CM

## 2017-11-19 LAB
ALBUMIN SERPL ELPH-MCNC: 2.9 G/DL — LOW (ref 3.3–5)
ALP SERPL-CCNC: 282 U/L — HIGH (ref 40–120)
ALT FLD-CCNC: 30 U/L — SIGNIFICANT CHANGE UP (ref 4–33)
AST SERPL-CCNC: 70 U/L — HIGH (ref 4–32)
BASE EXCESS BLDV CALC-SCNC: 17.1 MMOL/L — SIGNIFICANT CHANGE UP
BASOPHILS # BLD AUTO: 0.02 K/UL — SIGNIFICANT CHANGE UP (ref 0–0.2)
BASOPHILS NFR BLD AUTO: 0.2 % — SIGNIFICANT CHANGE UP (ref 0–2)
BILIRUB SERPL-MCNC: 0.5 MG/DL — SIGNIFICANT CHANGE UP (ref 0.2–1.2)
BLOOD GAS VENOUS - CREATININE: 0.74 MG/DL — SIGNIFICANT CHANGE UP (ref 0.5–1.3)
BUN SERPL-MCNC: 32 MG/DL — HIGH (ref 7–23)
CALCIUM SERPL-MCNC: 9.3 MG/DL — SIGNIFICANT CHANGE UP (ref 8.4–10.5)
CHLORIDE BLDV-SCNC: 102 MMOL/L — SIGNIFICANT CHANGE UP (ref 96–108)
CHLORIDE SERPL-SCNC: 99 MMOL/L — SIGNIFICANT CHANGE UP (ref 98–107)
CO2 SERPL-SCNC: 39 MMOL/L — HIGH (ref 22–31)
CORTIS SERPL-MCNC: 35.1 UG/DL — HIGH (ref 2.7–18.4)
CREAT SERPL-MCNC: 0.63 MG/DL — SIGNIFICANT CHANGE UP (ref 0.5–1.3)
EOSINOPHIL # BLD AUTO: 0.04 K/UL — SIGNIFICANT CHANGE UP (ref 0–0.5)
EOSINOPHIL NFR BLD AUTO: 0.3 % — SIGNIFICANT CHANGE UP (ref 0–6)
GAS PNL BLDV: 140 MMOL/L — SIGNIFICANT CHANGE UP (ref 136–146)
GLUCOSE BLDV-MCNC: 83 — SIGNIFICANT CHANGE UP (ref 70–99)
GLUCOSE SERPL-MCNC: 86 MG/DL — SIGNIFICANT CHANGE UP (ref 70–99)
HCO3 BLDV-SCNC: 38 MMOL/L — HIGH (ref 20–27)
HCT VFR BLD CALC: 37.4 % — SIGNIFICANT CHANGE UP (ref 34.5–45)
HCT VFR BLDV CALC: 30.6 % — LOW (ref 34.5–45)
HGB BLD-MCNC: 10 G/DL — LOW (ref 11.5–15.5)
HGB BLDV-MCNC: 9.9 G/DL — LOW (ref 11.5–15.5)
IMM GRANULOCYTES # BLD AUTO: 0.2 # — SIGNIFICANT CHANGE UP
IMM GRANULOCYTES NFR BLD AUTO: 1.7 % — HIGH (ref 0–1.5)
LACTATE BLDV-MCNC: 1.9 MMOL/L — SIGNIFICANT CHANGE UP (ref 0.5–2)
LYMPHOCYTES # BLD AUTO: 0.5 K/UL — LOW (ref 1–3.3)
LYMPHOCYTES # BLD AUTO: 4.4 % — LOW (ref 13–44)
MAGNESIUM SERPL-MCNC: 2 MG/DL — SIGNIFICANT CHANGE UP (ref 1.6–2.6)
MCHC RBC-ENTMCNC: 25 PG — LOW (ref 27–34)
MCHC RBC-ENTMCNC: 26.7 % — LOW (ref 32–36)
MCV RBC AUTO: 93.5 FL — SIGNIFICANT CHANGE UP (ref 80–100)
MONOCYTES # BLD AUTO: 1.68 K/UL — HIGH (ref 0–0.9)
MONOCYTES NFR BLD AUTO: 14.7 % — HIGH (ref 2–14)
NEUTROPHILS # BLD AUTO: 8.99 K/UL — HIGH (ref 1.8–7.4)
NEUTROPHILS NFR BLD AUTO: 78.7 % — HIGH (ref 43–77)
NRBC # FLD: 0.04 — SIGNIFICANT CHANGE UP
PCO2 BLDV: > 110 MMHG — CRITICAL HIGH (ref 41–51)
PH BLDV: 7.22 PH — LOW (ref 7.32–7.43)
PHOSPHATE SERPL-MCNC: 4.9 MG/DL — HIGH (ref 2.5–4.5)
PLATELET # BLD AUTO: 425 K/UL — HIGH (ref 150–400)
PMV BLD: 9.6 FL — SIGNIFICANT CHANGE UP (ref 7–13)
PO2 BLDV: 90 MMHG — HIGH (ref 35–40)
POTASSIUM BLDV-SCNC: 6.4 MMOL/L — CRITICAL HIGH (ref 3.4–4.5)
POTASSIUM SERPL-MCNC: 6.4 MMOL/L — CRITICAL HIGH (ref 3.5–5.3)
POTASSIUM SERPL-SCNC: 6.4 MMOL/L — CRITICAL HIGH (ref 3.5–5.3)
PROT SERPL-MCNC: 6.8 G/DL — SIGNIFICANT CHANGE UP (ref 6–8.3)
RBC # BLD: 4 M/UL — SIGNIFICANT CHANGE UP (ref 3.8–5.2)
RBC # FLD: 21.8 % — HIGH (ref 10.3–14.5)
SAO2 % BLDV: 95.9 % — HIGH (ref 60–85)
SODIUM SERPL-SCNC: 148 MMOL/L — HIGH (ref 135–145)
WBC # BLD: 11.43 K/UL — HIGH (ref 3.8–10.5)
WBC # FLD AUTO: 11.43 K/UL — HIGH (ref 3.8–10.5)

## 2017-11-19 PROCEDURE — 99233 SBSQ HOSP IP/OBS HIGH 50: CPT

## 2017-11-19 RX ORDER — HEPARIN SODIUM 5000 [USP'U]/ML
5000 INJECTION INTRAVENOUS; SUBCUTANEOUS EVERY 8 HOURS
Qty: 0 | Refills: 0 | Status: DISCONTINUED | OUTPATIENT
Start: 2017-11-19 | End: 2017-11-21

## 2017-11-19 RX ORDER — CHLORHEXIDINE GLUCONATE 213 G/1000ML
1 SOLUTION TOPICAL DAILY
Qty: 0 | Refills: 0 | Status: DISCONTINUED | OUTPATIENT
Start: 2017-11-19 | End: 2017-11-23

## 2017-11-19 RX ORDER — SODIUM POLYSTYRENE SULFONATE 4.1 MEQ/G
30 POWDER, FOR SUSPENSION ORAL ONCE
Qty: 0 | Refills: 0 | Status: COMPLETED | OUTPATIENT
Start: 2017-11-19 | End: 2017-11-19

## 2017-11-19 RX ORDER — MORPHINE SULFATE 50 MG/1
2 CAPSULE, EXTENDED RELEASE ORAL ONCE
Qty: 0 | Refills: 0 | Status: DISCONTINUED | OUTPATIENT
Start: 2017-11-19 | End: 2017-11-19

## 2017-11-19 RX ORDER — HEPARIN SODIUM 5000 [USP'U]/ML
5000 INJECTION INTRAVENOUS; SUBCUTANEOUS EVERY 8 HOURS
Qty: 0 | Refills: 0 | Status: DISCONTINUED | OUTPATIENT
Start: 2017-11-19 | End: 2017-11-19

## 2017-11-19 RX ADMIN — SODIUM POLYSTYRENE SULFONATE 30 GRAM(S): 4.1 POWDER, FOR SUSPENSION ORAL at 18:21

## 2017-11-19 RX ADMIN — MORPHINE SULFATE 2 MILLIGRAM(S): 50 CAPSULE, EXTENDED RELEASE ORAL at 10:40

## 2017-11-19 RX ADMIN — HEPARIN SODIUM 5000 UNIT(S): 5000 INJECTION INTRAVENOUS; SUBCUTANEOUS at 22:36

## 2017-11-19 RX ADMIN — HEPARIN SODIUM 5000 UNIT(S): 5000 INJECTION INTRAVENOUS; SUBCUTANEOUS at 15:07

## 2017-11-19 RX ADMIN — HEPARIN SODIUM 5000 UNIT(S): 5000 INJECTION INTRAVENOUS; SUBCUTANEOUS at 07:30

## 2017-11-19 RX ADMIN — CHLORHEXIDINE GLUCONATE 1 APPLICATION(S): 213 SOLUTION TOPICAL at 18:20

## 2017-11-19 NOTE — H&P ADULT - ASSESSMENT
Patient is a 56 y/o F w/ a PMHx of metastatic colon cancer with mets to liver and lung (on 4-5L home O2) and recent admission for hypoxic/hypercarbic respiratory failure who presented to the ED with shortness of breath, admitted for acute hypoxic/hypercarbic respiratory failure, now on BiPAP.

## 2017-11-19 NOTE — H&P ADULT - PROBLEM SELECTOR PLAN 6
- GOC discussed with patient's daughter/HCP. She prefers to keep patient DNI and not DNR for now. Discussed the likely futility in having patient DNI w/o a DNR. Pt's HCP states that she prefers to keep it this way for now, but she will continue to think about it.  - Will continue GOC discussion with patient's HCP in the AM

## 2017-11-19 NOTE — H&P ADULT - PROBLEM SELECTOR PLAN 2
- Patient with a history of colon Ca w/ mets to lung and liver. She follows with Oncology at [facility]. Her last chemotherapy was on 9/22  - Will notify patient's Oncologist of patient's admission in the AM

## 2017-11-19 NOTE — PATIENT PROFILE ADULT. - NS PRO CONTRA REFUSE FLU INFO
[facility] [phone]  
Vaccine Information Sheet (VIS) provided-VIS date: 8/07/15/Risks/benefits discussed with patient or patient surrogate

## 2017-11-19 NOTE — H&P ADULT - HISTORY OF PRESENT ILLNESS
Patient is a 58 y/o F w/ a PMHx of metastatic colon cancer with mets to liver and lung (on 4-5L home O2) and recent admission for hypoxic/hypercarbic respiratory failure who presented to the ED with shortness of breath. Unable to obtain history from patient as she was somnolent at time of encounter. History was obtained per chart review and from patient's daughter/HCP over the phone. Patient was recently admitted to the RCU on 11/11-11/15 for acute hypoxic/hypercarbic respiratory failure. Chest CTA at that time was negative for PE and RVP was negative. Patient was started on BiPAP during hospitalization with improvement in her symptoms. During patient's hospital course, she was treated with Diltiazem and Metoprolol for atrial tachycardia. Per patient's daughter, patient was initially doing well on day of discharge. However, the following day, patient was noted to become more drowsy and fatigued. She also began to doze off more often and became more confused. Patient's drowsiness/confusion progressed over the next few days and she started to develop worsening shortness of breath despite being on 4-5L NC on home O2. Patient's daughter became concerned and so she brought the patient back to the ED for further evaluation. Per patient's daughter, patient has not had any fever, chills, chest pain, cough, or abdominal pain since discharge. No recent falls. Patient has had poor PO intake recently as well. Patient's daughter reports that patient took her home Lasix (20mg QD) on day of presentation and the day before, but she states that patient did not take any doses of Diltiazem or Lopressor.    In the ED, pt's VS were: T = 98.9F, HR = 135, BP = 126/78, RR = 22-26, O2 Sat = 96 %. Patient became hypoxic to 85% in the ED and so she was placed on BiPAP. Labs were significant for leukocytosis (WBC = 12.85), Pro-BNP = 11,436, venous pH = 7.18-7.23, pCO2 = 109- > 110, and a lactate of 2.2-2.7. Patient was given 1L NS. Patient was then admitted to the RCU for further management.

## 2017-11-19 NOTE — H&P ADULT - NSHPPHYSICALEXAM_GEN_ALL_CORE
Vital Signs Last 24 Hrs  T(C): 37.4 (18 Nov 2017 20:38), Max: 37.4 (18 Nov 2017 20:38)  T(F): 99.4 (18 Nov 2017 20:38), Max: 99.4 (18 Nov 2017 20:38)  HR: 120 (19 Nov 2017 01:15) (107 - 136)  BP: 123/87 (19 Nov 2017 01:15) (121/78 - 151/90)  BP(mean): 99 (19 Nov 2017 01:15) (99 - 99)  RR: 94 (19 Nov 2017 01:15) (20 - 94)  SpO2: 99% (19 Nov 2017 00:50) (85% - 100%)    PHYSICAL EXAM:  Constitutional:  Eyes:  ENMT:  Neck:  Breasts:  Back:  Respiratory:  Cardiovascular:  Gastrointestinal:  Genitourinary:  Rectal:  Extremities:  Vascular:  Neurological:  Skin:  Lymph Nodes:  Musculoskeletal:  Psychiatric: Vital Signs Last 24 Hrs  T(C): 37.4 (18 Nov 2017 20:38), Max: 37.4 (18 Nov 2017 20:38)  T(F): 99.4 (18 Nov 2017 20:38), Max: 99.4 (18 Nov 2017 20:38)  HR: 120 (19 Nov 2017 01:15) (107 - 136)  BP: 123/87 (19 Nov 2017 01:15) (121/78 - 151/90)  BP(mean): 99 (19 Nov 2017 01:15) (99 - 99)  RR: 94 (19 Nov 2017 01:15) (20 - 94)  SpO2: 99% (19 Nov 2017 00:50) (85% - 100%)    PHYSICAL EXAM:  Constitutional: ill-appearing  HEENT: NC/AT; BiPAP mask currently in place  Neck: Supple  Respiratory: Rales appreciated bilaterally  Cardiovascular: Tachycardic, Regular rhythm  Gastrointestinal: Slightly hypoactive BS, + Abdominal distention  Extremities: 1+ pitting edema of bilateral LEs  Neurological: Somnolent, Patient is arousable to voice, but does not follow any commands, PERRLA  Skin: Warm and dry  Psychiatric: Unable to assess

## 2017-11-19 NOTE — H&P ADULT - NSHPREVIEWOFSYSTEMS_GEN_ALL_CORE
REVIEW OF SYSTEMS **** (Limited as patient is somnolent) **** REVIEW OF SYSTEMS **** (Limited as patient is somnolent) ****  ROS + for shortness of breath, confusion, weakness, and fatigue  ROS - for fever, chills, chest pain, cough, or abdominal pain

## 2017-11-19 NOTE — PROGRESS NOTE ADULT - SUBJECTIVE AND OBJECTIVE BOX
CHIEF COMPLAINT:    Interval Events:    REVIEW OF SYSTEMS:  Constitutional:   Eyes:  ENT:  CV:  Resp:  GI:  :  MSK:  Integumentary:  Neurological:  Psychiatric:  Endocrine:  Hematologic/Lymphatic:  Allergic/Immunologic:  [ ] All other systems negative  [ ] Unable to assess ROS because ________    OBJECTIVE:  ICU Vital Signs Last 24 Hrs  T(C): 37.4 (18 Nov 2017 20:38), Max: 37.4 (18 Nov 2017 20:38)  T(F): 99.4 (18 Nov 2017 20:38), Max: 99.4 (18 Nov 2017 20:38)  HR: 124 (19 Nov 2017 07:23) (107 - 136)  BP: 123/87 (19 Nov 2017 01:15) (121/78 - 151/90)  BP(mean): 99 (19 Nov 2017 01:15) (99 - 99)  ABP: --  ABP(mean): --  RR: 94 (19 Nov 2017 01:15) (20 - 94)  SpO2: 95% (19 Nov 2017 07:23) (85% - 100%)        CAPILLARY BLOOD GLUCOSE          PHYSICAL EXAM:  General:   HEENT:   Lymph Nodes:  Neck:   Respiratory:   Cardiovascular:   Abdomen:   Extremities:   Skin:   Neurological:  Psychiatry:    HOSPITAL MEDICATIONS:  MEDICATIONS  (STANDING):  heparin  Injectable 5000 Unit(s) SubCutaneous every 8 hours    MEDICATIONS  (PRN):      LABS:                        10.6   12.85 )-----------( 441      ( 18 Nov 2017 20:30 )             38.1     11-18    144  |  95<L>  |  28<H>  ----------------------------<  117<H>  5.8<H>   |  37<H>  |  0.68    Ca    9.7      18 Nov 2017 20:30    TPro  7.5  /  Alb  3.0<L>  /  TBili  0.6  /  DBili  x   /  AST  67<H>  /  ALT  29  /  AlkPhos  289<H>  11-18          Venous Blood Gas:  11-18 @ 22:15  7.18/> 110/50/35/73.2  VBG Lactate: 2.2  Venous Blood Gas:  11-18 @ 21:15  7.23/109/54/37/79.2  VBG Lactate: 2.5  Venous Blood Gas:  11-18 @ 20:30  7.21/> 110/70/39/88.9  VBG Lactate: 2.7      MICROBIOLOGY:     RADIOLOGY:  [ ] Reviewed and interpreted by me    PULMONARY FUNCTION TESTS:    EKG:    I&O's Summary CHIEF COMPLAINT:    Interval Events:    REVIEW OF SYSTEMS:  Constitutional:   Eyes:  ENT:  CV:  Resp:  GI:  :  MSK:  Integumentary:  Neurological:  Psychiatric:  Endocrine:  Hematologic/Lymphatic:  Allergic/Immunologic:  [ ] All other systems negative  [ ] Unable to assess ROS because ________    OBJECTIVE:  ICU Vital Signs Last 24 Hrs  T(C): 37.4 (18 Nov 2017 20:38), Max: 37.4 (18 Nov 2017 20:38)  T(F): 99.4 (18 Nov 2017 20:38), Max: 99.4 (18 Nov 2017 20:38)  HR: 124 (19 Nov 2017 07:23) (107 - 136)  BP: 123/87 (19 Nov 2017 01:15) (121/78 - 151/90)  BP(mean): 99 (19 Nov 2017 01:15) (99 - 99)  ABP: --  ABP(mean): --  RR: 94 (19 Nov 2017 01:15) (20 - 94)  SpO2: 95% (19 Nov 2017 07:23) (85% - 100%)        CAPILLARY BLOOD GLUCOSE          PHYSICAL EXAM:  General:   HEENT:   Lymph Nodes:  Neck:   Respiratory:   Cardiovascular:   Abdomen:   Extremities:   Skin:   Neurological:  Psychiatry:    HOSPITAL MEDICATIONS:  MEDICATIONS  (STANDING):  heparin  Injectable 5000 Unit(s) SubCutaneous every 8 hours    MEDICATIONS  (PRN):      LABS:                        10.6   12.85 )-----------( 441      ( 18 Nov 2017 20:30 )             38.1     11-18    144  |  95<L>  |  28<H>  ----------------------------<  117<H>  5.8<H>   |  37<H>  |  0.68    Ca    9.7      18 Nov 2017 20:30    TPro  7.5  /  Alb  3.0<L>  /  TBili  0.6  /  DBili  x   /  AST  67<H>  /  ALT  29  /  AlkPhos  289<H>  11-18          Venous Blood Gas:  11-18 @ 22:15  7.18/> 110/50/35/73.2  VBG Lactate: 2.2  Venous Blood Gas:  11-18 @ 21:15  7.23/109/54/37/79.2  VBG Lactate: 2.5  Venous Blood Gas:  11-18 @ 20:30  7.21/> 110/70/39/88.9  VBG Lactate: 2.7      MICROBIOLOGY:     RADIOLOGY:  [ ] Reviewed and interpreted by me    PULMONARY FUNCTION TESTS:    EKG:    I&O's Summary    Physical Exam  Gen: uncomfortable breathing, poorly responsive, but reponsive to verbal  HEENT: WNL  Neck: WNL  CV: WNL  Pulm: on BIPAP, coarse BS bilaterally  GI;  WNL  Ext: 1+ edema to LEs CHIEF COMPLAINT:Patient is a 57y old  Female who presented with a chief complaint of Shortness of breath (19 Nov 2017 03:19)      Interval Events: deteriorating mental status    REVIEW OF SYSTEMS:  Constitutional:   Eyes:  ENT:  CV:  Resp:  GI:  :  MSK:  Integumentary:  Neurological:  Psychiatric:  Endocrine:  Hematologic/Lymphatic:  Allergic/Immunologic:  [ ] All other systems negative  [x] Unable to assess ROS because pt is lethargic    OBJECTIVE:  ICU Vital Signs Last 24 Hrs  T(C): 37.4 (18 Nov 2017 20:38), Max: 37.4 (18 Nov 2017 20:38)  T(F): 99.4 (18 Nov 2017 20:38), Max: 99.4 (18 Nov 2017 20:38)  HR: 124 (19 Nov 2017 07:23) (107 - 136)  BP: 123/87 (19 Nov 2017 01:15) (121/78 - 151/90)  BP(mean): 99 (19 Nov 2017 01:15) (99 - 99)  RR: 94 (19 Nov 2017 01:15) (20 - 94)  SpO2: 95% (19 Nov 2017 07:23) (85% - 100%)        HOSPITAL MEDICATIONS:  MEDICATIONS  (STANDING):  heparin  Injectable 5000 Unit(s) SubCutaneous every 8 hours    MEDICATIONS  (PRN):      LABS:                        10.6   12.85 )-----------( 441      ( 18 Nov 2017 20:30 )             38.1     11-18    144  |  95<L>  |  28<H>  ----------------------------<  117<H>  5.8<H>   |  37<H>  |  0.68    Ca    9.7      18 Nov 2017 20:30    TPro  7.5  /  Alb  3.0<L>  /  TBili  0.6  /  DBili  x   /  AST  67<H>  /  ALT  29  /  AlkPhos  289<H>  11-18          Venous Blood Gas:  11-18 @ 22:15  7.18/> 110/50/35/73.2  VBG Lactate: 2.2  Venous Blood Gas:  11-18 @ 21:15  7.23/109/54/37/79.2  VBG Lactate: 2.5  Venous Blood Gas:  11-18 @ 20:30  7.21/> 110/70/39/88.9  VBG Lactate: 2.7      MICROBIOLOGY:     RADIOLOGY:  [ ] Reviewed and interpreted by me    PULMONARY FUNCTION TESTS:    EKG:    I&O's Summary    Physical Exam  Gen: uncomfortable breathing, poorly responsive, but reponsive to verbal  HEENT: WNL  Neck: WNL  CV: WNL  Pulm: on BIPAP, coarse BS bilaterally  GI;  WNL  Ext: 1+ edema to LEs

## 2017-11-19 NOTE — H&P ADULT - PROBLEM SELECTOR PLAN 4
- Patient found to have a leukocytosis of 12.85 on presentation. Of note, patient's WBC is grossly unchanged since recent discharge and she was recently on steroids. Patient is currently afebrile and w/o s/s infection. Will monitor off abx for now.  - Monitor CBC

## 2017-11-19 NOTE — H&P ADULT - NSHPLABSRESULTS_GEN_ALL_CORE
CBC Full  -  ( 18 Nov 2017 20:30 )  WBC Count : 12.85 K/uL  Hemoglobin : 10.6 g/dL  Hematocrit : 38.1 %  Platelet Count - Automated : 441 K/uL  Mean Cell Volume : 89.4 fL  Mean Cell Hemoglobin : 24.9 pg  Mean Cell Hemoglobin Concentration : 27.8 %  Auto Neutrophil # : 10.22 K/uL  Auto Lymphocyte # : 0.72 K/uL  Auto Monocyte # : 1.75 K/uL  Auto Eosinophil # : 0.06 K/uL  Auto Basophil # : 0.01 K/uL  Auto Neutrophil % : 79.5 %  Auto Lymphocyte % : 5.6 %  Auto Monocyte % : 13.6 %  Auto Eosinophil % : 0.5 %  Auto Basophil % : 0.1 %    11-18    144  |  95<L>  |  28<H>  ----------------------------<  117<H>  5.8<H>   |  37<H>  |  0.68    Ca    9.7      18 Nov 2017 20:30    TPro  7.5  /  Alb  3.0<L>  /  TBili  0.6  /  DBili  x   /  AST  67<H>  /  ALT  29  /  AlkPhos  289<H>  11-18    CARDIAC MARKERS ( 18 Nov 2017 20:30 )  x     / < 0.06 ng/mL / 93 u/L / 2.66 ng/mL / x        EKG: Sinus tachycardia (rate = 136 bpm), LAD, QT/QTc = 284/427 CBC Full  -  ( 18 Nov 2017 20:30 )  WBC Count : 12.85 K/uL  Hemoglobin : 10.6 g/dL  Hematocrit : 38.1 %  Platelet Count - Automated : 441 K/uL  Mean Cell Volume : 89.4 fL  Mean Cell Hemoglobin : 24.9 pg  Mean Cell Hemoglobin Concentration : 27.8 %  Auto Neutrophil # : 10.22 K/uL  Auto Lymphocyte # : 0.72 K/uL  Auto Monocyte # : 1.75 K/uL  Auto Eosinophil # : 0.06 K/uL  Auto Basophil # : 0.01 K/uL  Auto Neutrophil % : 79.5 %  Auto Lymphocyte % : 5.6 %  Auto Monocyte % : 13.6 %  Auto Eosinophil % : 0.5 %  Auto Basophil % : 0.1 %    11-18    144  |  95<L>  |  28<H>  ----------------------------<  117<H>  5.8<H>   |  37<H>  |  0.68    Ca    9.7      18 Nov 2017 20:30    TPro  7.5  /  Alb  3.0<L>  /  TBili  0.6  /  DBili  x   /  AST  67<H>  /  ALT  29  /  AlkPhos  289<H>  11-18    CARDIAC MARKERS ( 18 Nov 2017 20:30 )  x     / < 0.06 ng/mL / 93 u/L / 2.66 ng/mL / x        EKG: Sinus tachycardia (rate = 136 bpm), LAD, QT/QTc = 284/427 (Grossly unchanged from prior)    CXR (Prelim): Bilateral confluent metastatic disease, unchanged.

## 2017-11-19 NOTE — PATIENT PROFILE ADULT. - NUTRITION COMMENT, PROFILE
Last meal taken 11/18 approximately 6pm - string beans carrots and spinach boiled and cut up into small pieces.

## 2017-11-19 NOTE — CHART NOTE - NSCHARTNOTEFT_GEN_A_CORE
Called to patient's bedside to perform a formal goals of care discussion.    The patient's daughter, Abiel, was present and a detailed explanation of the clinical course was had.    Abiel understood and wanted to go down the route of DNR/DNI with comfort measures.    The other daughter who is the health care proxy was also in agreement, but wasn't present at bedside.    The patient will be given morphine to control sxs of air hunger and titrated accordingly.    Clint Flores,   Pulmonary and Critical Care Fellow

## 2017-11-19 NOTE — H&P ADULT - PROBLEM SELECTOR PLAN 3
- Patient started on Cardizem and Metoprolol during her last admission for atrial tachycardia which is likely 2/2 underlying lung disease burden. Patient found to be in continued sinus tachycardia on presentation today. Patient's HR is currently stable between 100-120  - As pt is on BiPAP currently, will hold off on starting Metoprolol and/or Cardizem for now to reduce risk of hypotension  - C/w pulse oximetry. Will use Metoprolol and/or Cardizem PRN for now

## 2017-11-19 NOTE — H&P ADULT - PROBLEM SELECTOR PLAN 1
- Patient recently hospitalized from 11/11-11/15 for acute hypoxic/hypercarbic respiratory failure which improved s/p BiPAP. Patient returns with worsening dyspnea, fatigue, and confusion over the last 3-days. Patient found to be hypoxic to 85% in the ED with VBG showing hypercapnic respiratory failure (pH = 7.18, pCO2 = > 110). Respiratory failire is likely 2/2 worsening lung metastases a/w poor lung reserve volume. Patient started on BiPAP in the ED and admitted to RCU.  - Per chart review, GOC discussion during patient's last admission revealed that patient was DNI, but not DNR. GOC again reviewed with patient's daughter who prefers to keep patient DNI w/o DNR for now.  - Will keep patient on BiPAP and monitor for improvement in hypercapnia and mental status   - C/w pulse oximetry  - Monitor blood gas

## 2017-11-20 DIAGNOSIS — Z51.5 ENCOUNTER FOR PALLIATIVE CARE: ICD-10-CM

## 2017-11-20 DIAGNOSIS — R53.2 FUNCTIONAL QUADRIPLEGIA: ICD-10-CM

## 2017-11-20 LAB
BUN SERPL-MCNC: 44 MG/DL — HIGH (ref 7–23)
CALCIUM SERPL-MCNC: 9.3 MG/DL — SIGNIFICANT CHANGE UP (ref 8.4–10.5)
CHLORIDE SERPL-SCNC: 98 MMOL/L — SIGNIFICANT CHANGE UP (ref 98–107)
CO2 SERPL-SCNC: 37 MMOL/L — HIGH (ref 22–31)
CREAT SERPL-MCNC: 1.67 MG/DL — HIGH (ref 0.5–1.3)
GLUCOSE SERPL-MCNC: 67 MG/DL — LOW (ref 70–99)
HCT VFR BLD CALC: 38.4 % — SIGNIFICANT CHANGE UP (ref 34.5–45)
HGB BLD-MCNC: 9.9 G/DL — LOW (ref 11.5–15.5)
MAGNESIUM SERPL-MCNC: 2.2 MG/DL — SIGNIFICANT CHANGE UP (ref 1.6–2.6)
MCHC RBC-ENTMCNC: 24.3 PG — LOW (ref 27–34)
MCHC RBC-ENTMCNC: 25.8 % — LOW (ref 32–36)
MCV RBC AUTO: 94.1 FL — SIGNIFICANT CHANGE UP (ref 80–100)
NRBC # FLD: 0.04 — SIGNIFICANT CHANGE UP
PHOSPHATE SERPL-MCNC: 6.5 MG/DL — HIGH (ref 2.5–4.5)
PLATELET # BLD AUTO: 413 K/UL — HIGH (ref 150–400)
PMV BLD: 10 FL — SIGNIFICANT CHANGE UP (ref 7–13)
POTASSIUM SERPL-MCNC: 6 MMOL/L — HIGH (ref 3.5–5.3)
POTASSIUM SERPL-SCNC: 6 MMOL/L — HIGH (ref 3.5–5.3)
RBC # BLD: 4.08 M/UL — SIGNIFICANT CHANGE UP (ref 3.8–5.2)
RBC # FLD: 22 % — HIGH (ref 10.3–14.5)
SODIUM SERPL-SCNC: 148 MMOL/L — HIGH (ref 135–145)
WBC # BLD: 10.39 K/UL — SIGNIFICANT CHANGE UP (ref 3.8–10.5)
WBC # FLD AUTO: 10.39 K/UL — SIGNIFICANT CHANGE UP (ref 3.8–10.5)

## 2017-11-20 PROCEDURE — 99233 SBSQ HOSP IP/OBS HIGH 50: CPT | Mod: GC

## 2017-11-20 PROCEDURE — 99223 1ST HOSP IP/OBS HIGH 75: CPT

## 2017-11-20 RX ORDER — SODIUM CHLORIDE 9 MG/ML
1000 INJECTION INTRAMUSCULAR; INTRAVENOUS; SUBCUTANEOUS
Qty: 0 | Refills: 0 | Status: DISCONTINUED | OUTPATIENT
Start: 2017-11-20 | End: 2017-11-23

## 2017-11-20 RX ORDER — HYDROMORPHONE HYDROCHLORIDE 2 MG/ML
0.5 INJECTION INTRAMUSCULAR; INTRAVENOUS; SUBCUTANEOUS
Qty: 0 | Refills: 0 | Status: DISCONTINUED | OUTPATIENT
Start: 2017-11-20 | End: 2017-11-23

## 2017-11-20 RX ADMIN — HEPARIN SODIUM 5000 UNIT(S): 5000 INJECTION INTRAVENOUS; SUBCUTANEOUS at 06:58

## 2017-11-20 RX ADMIN — HEPARIN SODIUM 5000 UNIT(S): 5000 INJECTION INTRAVENOUS; SUBCUTANEOUS at 14:07

## 2017-11-20 RX ADMIN — SODIUM CHLORIDE 30 MILLILITER(S): 9 INJECTION INTRAMUSCULAR; INTRAVENOUS; SUBCUTANEOUS at 18:42

## 2017-11-20 RX ADMIN — CHLORHEXIDINE GLUCONATE 1 APPLICATION(S): 213 SOLUTION TOPICAL at 10:20

## 2017-11-20 RX ADMIN — HEPARIN SODIUM 5000 UNIT(S): 5000 INJECTION INTRAVENOUS; SUBCUTANEOUS at 22:36

## 2017-11-20 RX ADMIN — HYDROMORPHONE HYDROCHLORIDE 0.5 MILLIGRAM(S): 2 INJECTION INTRAMUSCULAR; INTRAVENOUS; SUBCUTANEOUS at 16:20

## 2017-11-20 NOTE — CONSULT NOTE ADULT - PROBLEM SELECTOR RECOMMENDATION 9
No further disease modifying interventions.  Patient has extensive metastatic disease.  Prognosis is grave.  Patient is actively dying.

## 2017-11-20 NOTE — CONSULT NOTE ADULT - SUBJECTIVE AND OBJECTIVE BOX
HPI:  Patient is a 58 y/o F w/ a PMHx of metastatic colon cancer with mets to liver and lung (on 4-5L home O2) and recent admission for hypoxic/hypercarbic respiratory failure who presented to the ED with shortness of breath. Unable to obtain history from patient as she was somnolent at time of encounter. History was obtained per chart review and from patient's daughter/HCP over the phone. Patient was recently admitted to the [facility] on 11/11-11/15 for acute hypoxic/hypercarbic respiratory failure. Chest CTA at that time was negative for PE and RVP was negative. Patient was started on BiPAP during hospitalization with improvement in her symptoms. During patient's hospital course, she was treated with Diltiazem and Metoprolol for atrial tachycardia. Per patient's daughter, patient was initially doing well on day of discharge. However, the following day, patient was noted to become more drowsy and fatigued. She also began to doze off more often and became more confused. Patient's drowsiness/confusion progressed over the next few days and she started to develop worsening shortness of breath despite being on 4-5L NC on home O2. Patient's daughter became concerned and so she brought the patient back to the ED for further evaluation. Per patient's daughter, patient has not had any fever, chills, chest pain, cough, or abdominal pain since discharge. No recent falls. Patient has had poor PO intake recently as well. Patient's daughter reports that patient took her home Lasix (20mg QD) on day of presentation and the day before, but she states that patient did not take any doses of Diltiazem or Lopressor.    In the ED, pt's VS were: T = 98.9F, HR = 135, BP = 126/78, RR = 22-26, O2 Sat = 96 %. Patient became hypoxic to 85% in the ED and so she was placed on BiPAP. Labs were significant for leukocytosis (WBC = 12.85), Pro-BNP = 11,436, venous pH = 7.18-7.23, pCO2 = 109- > 110, and a lactate of 2.2-2.7. Patient was given 1L NS. Patient was then admitted to the RCU for further management. (19 Nov 2017 03:19)      PERTINENT PMH REVIEWED:  [ ] YES [ ] NO           SOCIAL HISTORY:  Significant other/partner:  [ ] YES  [ ] NO            Children:  [ ] YES  [ ] NO                   Synagogue/Spirituality:  Subtance hx:  [ ] YES   [ ] NO           Tobacco hx:  [ ] YES  [ ] NO             Alcohol hx: [ ] YES  [ ] NO        Home Opiod hx:  [ ] YES  [ ] NO   Living Situation: [ ] Home  [ ] Long term care  [ ] Rehab    REFERRALS:   [ ] Chaplaincy  [ ] Hospice  [ ] Child Life  [ ] Social Work  [ ] Case management [ ] Holistic Therapy     FAMILY HISTORY:  No pertinent family history in first degree relatives    [ ] Family history non contributory     BASELINE ADLs (prior to admission):  Independent [ ] moderately [ ] fully   Dependent   [ ] moderately [ ] fully    ADVANCE DIRECTIVES:  [ ] YES [ ] NO   DNR [ ] YES [ ] NO                      MOLST  [ ] YES [ ] NO    Living Will  [ ] YES [ ] NO    Health Care Proxy [ ] YES  [ ] NO      [ ] Surrogate  [ ] HCP  [ ] Guardian:                                                                  Phone#:    Allergies    No Known Allergies    Intolerances        MEDICATIONS  (STANDING):  chlorhexidine 4% Liquid 1 Application(s) Topical daily  heparin  Injectable 5000 Unit(s) SubCutaneous every 8 hours    MEDICATIONS  (PRN):      PRESENT SYMPTOMS:  Source: [ ] Patient   [ ] Family   [ ] Team     Pain: [ ] YES [ ] NO  OLDCARTS:     Dyspnea: [ ] YES [ ] NO   Anxiety: [ ] YES [ ] NO  Fatigue: [ ] YES [ ] NO   Nausea: [ ] YES [ ] NO  Loss of appetite: [ ] YES [ ] NO   Constipation: [ ] YES [ ] NO     Other Symptoms:  [ ] All other review of systems negative   [ ] Unable to obtain due to poor mentation     Karnofsky Performance Score/Palliative Performance Status Version 2:         %  Protein Calorie Malutrition:  [ ] Mild   [ ] Moderate   [ ] Severe     Vital Signs Last 24 Hrs  T(C): 36.4 (20 Nov 2017 06:55), Max: 36.7 (19 Nov 2017 18:20)  T(F): 97.5 (20 Nov 2017 06:55), Max: 98.1 (19 Nov 2017 18:20)  HR: 121 (20 Nov 2017 07:25) (118 - 123)  BP: 108/66 (20 Nov 2017 06:55) (94/61 - 118/79)  BP(mean): 88 (19 Nov 2017 14:40) (88 - 88)  RR: 20 (20 Nov 2017 06:55) (20 - 21)  SpO2: 95% (20 Nov 2017 07:25) (94% - 100%)    Physical Exam:    General: [ ] Alert,  A&O x     [ ] lethargic   [ ] Agitated   [ ] Cachexia   HEENT: [ ] Normal   [ ] Dry mouth   [ ] ET Tube    [ ] Trach   Lungs: [ ] Clear [ ] Rhonchi  [ ] Crackles [ ] Wheezing [ ] Tachypnea  [ ] Audible excessive secretions   Cardiovascular:  [ ] Regular rate and rhythm  [ ] Irregular [ ] Tachycardia   [ ] Bradycardia   Abdomen: [ ] Soft  [ ] Distended  [ ]  [ ] +BS  [ ] Non tender [ ] Tender  [ ]PEG   [ ] NGT   Last BM:     Genitourinary: [ ] Normal [ ] Incontinent   [ ] Oliguria/Anuria   [ ] Jimenez  Musculoskeletal:  [ ] Normal   [ ] Generalized weakness  [ ] Bedbound   Neurological: [ ] No focal deficits  [ ] Cognitive impairment     Skin: [ ] Normal   [ ] Pressure ulcers     LABS:                        9.9    10.39 )-----------( 413      ( 20 Nov 2017 04:45 )             38.4     11-20    148<H>  |  98  |  44<H>  ----------------------------<  67<L>  6.0<H>   |  37<H>  |  1.67<H>    Ca    9.3      20 Nov 2017 04:45  Phos  6.5     11-20  Mg     2.2     11-20    TPro  6.8  /  Alb  2.9<L>  /  TBili  0.5  /  DBili  x   /  AST  70<H>  /  ALT  30  /  AlkPhos  282<H>  11-19        I&O's Summary      RADIOLOGY & ADDITIONAL STUDIES: HPI:  Patient is a 58 y/o F w/ a PMHx of metastatic colon cancer with mets to liver and lung (on 4-5L home O2) and recent admission for hypoxic/hypercarbic respiratory failure who presented to the ED with shortness of breath. Unable to obtain history from patient as she was somnolent at time of encounter. History was obtained per chart review and from patient's daughter/HCP over the phone. Patient was recently admitted to the [facility]CU on 11/11-11/15 for acute hypoxic/hypercarbic respiratory failure. Chest CTA at that time was negative for PE and RVP was negative. Patient was started on BiPAP during hospitalization with improvement in her symptoms. During patient's hospital course, she was treated with Diltiazem and Metoprolol for atrial tachycardia. Per patient's daughter, patient was initially doing well on day of discharge. However, the following day, patient was noted to become more drowsy and fatigued. She also began to doze off more often and became more confused. Patient's drowsiness/confusion progressed over the next few days and she started to develop worsening shortness of breath despite being on 4-5L NC on home O2. Patient's daughter became concerned and so she brought the patient back to the ED for further evaluation. Per patient's daughter, patient has not had any fever, chills, chest pain, cough, or abdominal pain since discharge. No recent falls. Patient has had poor PO intake recently as well. Patient's daughter reports that patient took her home Lasix (20mg QD) on day of presentation and the day before, but she states that patient did not take any doses of Diltiazem or Lopressor.    PERTINENT PMH REVIEWED:  [ x] YES [ ] NO           SOCIAL HISTORY:  Significant other/partner:  [ ] YES  [x ] NO            Children:  [x ] YES  [ ] NO                   Holiness/Spirituality:  Subtance hx:  [ ] YES   [x ] NO           Tobacco hx:  [ ] YES  [x ] NO             Alcohol hx: [ ] YES  [x ] NO        Home Opiod hx:  [ ] YES  [ x] NO   Living Situation: [x ] Home  [ ] Long term care  [ ] Rehab    REFERRALS:   [x ] Chaplaincy  [ ] Hospice  [ ] Child Life  [ ] Social Work  [ ] Case management [ ] Holistic Therapy     FAMILY HISTORY:  No pertinent family history in first degree relatives    [x ] Family history non contributory     BASELINE ADLs (prior to admission):  Independent [ ] moderately [ ] fully   Dependent   [ x] moderately [ ] fully    ADVANCE DIRECTIVES:  [x ] YES [ ] NO   DNR [ x] YES [ ] NO                      MOLST  [ ] YES [ x] NO    Living Will  [ ] YES [ x] NO    Health Care Proxy [ ] YES  [x ] NO      [ x] Surrogate  [ ] HCP  [ ] Guardian:    Tasha Jason (daughter)             Phone#: [phone]    Allergies    No Known Allergies    Intolerances        MEDICATIONS  (STANDING):  chlorhexidine 4% Liquid 1 Application(s) Topical daily  heparin  Injectable 5000 Unit(s) SubCutaneous every 8 hours    MEDICATIONS  (PRN):      PRESENT SYMPTOMS:  Source: [ ] Patient   [ x] Family   [ ] Team     Pain: [ ] YES [ x] NO  OLDCARTS:   Unable  Dyspnea: [ ] YES [ ] NO   Anxiety: [ ] YES [ ] NO  Fatigue: [ ] YES [ ] NO   Nausea: [ ] YES [ ] NO  Loss of appetite: [ ] YES [ ] NO   Constipation: [ ] YES [ ] NO     Other Symptoms:  [ ] All other review of systems negative   [ x] Unable to obtain due to poor mentation     Karnofsky Performance Score/Palliative Performance Status Version 2:   20      %  Protein Calorie Malutrition:  [ ] Mild   [ ] Moderate   [ x] Severe     Vital Signs Last 24 Hrs  T(C): 36.4 (20 Nov 2017 06:55), Max: 36.7 (19 Nov 2017 18:20)  T(F): 97.5 (20 Nov 2017 06:55), Max: 98.1 (19 Nov 2017 18:20)  HR: 121 (20 Nov 2017 07:25) (118 - 123)  BP: 108/66 (20 Nov 2017 06:55) (94/61 - 118/79)  BP(mean): 88 (19 Nov 2017 14:40) (88 - 88)  RR: 20 (20 Nov 2017 06:55) (20 - 21)  SpO2: 95% (20 Nov 2017 07:25) (94% - 100%)    Physical Exam:    General: [ ] Alert,  A&O x     [x ] lethargic   [ ] Agitated   [ ] Cachexia   HEENT: [ ] Normal   [x ] Dry mouth   [ ] ET Tube    [ ] Trach   Lungs: [ ] Clear [x ] Rhonchi  [ ] Crackles [ ] Wheezing [x ] Tachypnea  [ ] Audible excessive secretions   Cardiovascular:  [ ] Regular rate and rhythm  [ ] Irregular [x ] Tachycardia   [ ] Bradycardia   Abdomen: [x ] Soft  [ ] Distended  [ ]  [ ] +BS  [ ] Non tender [ ] Tender  [ ]PEG   [ ] NGT   Last BM:     Genitourinary: [ ] Normal [ ] Incontinent   [ ] Oliguria/Anuria   [x ] Jimenez  Musculoskeletal:  [ ] Normal   [ ] Generalized weakness  [ x] Bedbound   Neurological: [ ] No focal deficits  [x ] Cognitive impairment     Skin: [x ] Normal   [ ] Pressure ulcers     LABS:                        9.9    10.39 )-----------( 413      ( 20 Nov 2017 04:45 )             38.4     11-20    148<H>  |  98  |  44<H>  ----------------------------<  67<L>  6.0<H>   |  37<H>  |  1.67<H>    Ca    9.3      20 Nov 2017 04:45  Phos  6.5     11-20  Mg     2.2     11-20    TPro  6.8  /  Alb  2.9<L>  /  TBili  0.5  /  DBili  x   /  AST  70<H>  /  ALT  30  /  AlkPhos  282<H>  11-19        I&O's Summary      RADIOLOGY & ADDITIONAL STUDIES:

## 2017-11-20 NOTE — CONSULT NOTE ADULT - ASSESSMENT
57 F with metastatic colon cancer, acute respiratory failure, functional quadriplegia, encounter for palliative care.

## 2017-11-20 NOTE — PROGRESS NOTE ADULT - SUBJECTIVE AND OBJECTIVE BOX
CHIEF COMPLAINT:    Interval Events:    REVIEW OF SYSTEMS:  Constitutional:   Eyes:  ENT:  CV:  Resp:  GI:  :  MSK:  Integumentary:  Neurological:  Psychiatric:  Endocrine:  Hematologic/Lymphatic:  Allergic/Immunologic:  [ ] All other systems negative  [ ] Unable to assess ROS because ________    OBJECTIVE:  ICU Vital Signs Last 24 Hrs  T(C): 36.4 (20 Nov 2017 06:55), Max: 36.7 (19 Nov 2017 18:20)  T(F): 97.5 (20 Nov 2017 06:55), Max: 98.1 (19 Nov 2017 18:20)  HR: 121 (20 Nov 2017 07:25) (118 - 124)  BP: 108/66 (20 Nov 2017 06:55) (94/61 - 118/79)  BP(mean): 88 (19 Nov 2017 14:40) (88 - 88)  ABP: --  ABP(mean): --  RR: 20 (20 Nov 2017 06:55) (20 - 30)  SpO2: 95% (20 Nov 2017 07:25) (94% - 100%)        CAPILLARY BLOOD GLUCOSE          PHYSICAL EXAM:  General:   HEENT:   Lymph Nodes:  Neck:   Respiratory:   Cardiovascular:   Abdomen:   Extremities:   Skin:   Neurological:  Psychiatry:    HOSPITAL MEDICATIONS:  MEDICATIONS  (STANDING):  chlorhexidine 4% Liquid 1 Application(s) Topical daily  heparin  Injectable 5000 Unit(s) SubCutaneous every 8 hours    MEDICATIONS  (PRN):      LABS:                        9.9    10.39 )-----------( 413      ( 20 Nov 2017 04:45 )             38.4     11-20    148<H>  |  98  |  44<H>  ----------------------------<  67<L>  6.0<H>   |  37<H>  |  1.67<H>    Ca    9.3      20 Nov 2017 04:45  Phos  6.5     11-20  Mg     2.2     11-20    TPro  6.8  /  Alb  2.9<L>  /  TBili  0.5  /  DBili  x   /  AST  70<H>  /  ALT  30  /  AlkPhos  282<H>  11-19          Venous Blood Gas:  11-19 @ 09:00  7.22/> 110/90/38/95.9  VBG Lactate: 1.9  Venous Blood Gas:  11-18 @ 22:15  7.18/> 110/50/35/73.2  VBG Lactate: 2.2  Venous Blood Gas:  11-18 @ 21:15  7.23/109/54/37/79.2  VBG Lactate: 2.5  Venous Blood Gas:  11-18 @ 20:30  7.21/> 110/70/39/88.9  VBG Lactate: 2.7      MICROBIOLOGY:     RADIOLOGY:  [ ] Reviewed and interpreted by me    PULMONARY FUNCTION TESTS:    EKG: CHIEF COMPLAINT: Patient is a 57y old  Female who presents with a chief complaint of Shortness of breath (19 Nov 2017 03:19)    Interval Events: Comfort care    REVIEW OF SYSTEMS:  Constitutional: Denies pain when asked this morning.  Eyes:  ENT:  CV:  Resp:  GI:  :  MSK:  Integumentary:  Neurological:  Psychiatric:  Endocrine:  Hematologic/Lymphatic:  Allergic/Immunologic:  [x] All other systems negative  [ ] Unable to assess ROS because ________    OBJECTIVE:  ICU Vital Signs Last 24 Hrs  T(C): 36.4 (20 Nov 2017 06:55), Max: 36.7 (19 Nov 2017 18:20)  T(F): 97.5 (20 Nov 2017 06:55), Max: 98.1 (19 Nov 2017 18:20)  HR: 121 (20 Nov 2017 07:25) (118 - 124)  BP: 108/66 (20 Nov 2017 06:55) (94/61 - 118/79)  BP(mean): 88 (19 Nov 2017 14:40) (88 - 88)  ABP: --  ABP(mean): --  RR: 20 (20 Nov 2017 06:55) (20 - 30)  SpO2: 95% (20 Nov 2017 07:25) (94% - 100%)        CAPILLARY BLOOD GLUCOSE        HOSPITAL MEDICATIONS:  MEDICATIONS  (STANDING):  chlorhexidine 4% Liquid 1 Application(s) Topical daily  heparin  Injectable 5000 Unit(s) SubCutaneous every 8 hours    MEDICATIONS  (PRN):      LABS:                        9.9    10.39 )-----------( 413      ( 20 Nov 2017 04:45 )             38.4     11-20    148<H>  |  98  |  44<H>  ----------------------------<  67<L>  6.0<H>   |  37<H>  |  1.67<H>    Ca    9.3      20 Nov 2017 04:45  Phos  6.5     11-20  Mg     2.2     11-20    TPro  6.8  /  Alb  2.9<L>  /  TBili  0.5  /  DBili  x   /  AST  70<H>  /  ALT  30  /  AlkPhos  282<H>  11-19          Venous Blood Gas:  11-19 @ 09:00  7.22/> 110/90/38/95.9  VBG Lactate: 1.9  Venous Blood Gas:  11-18 @ 22:15  7.18/> 110/50/35/73.2  VBG Lactate: 2.2  Venous Blood Gas:  11-18 @ 21:15  7.23/109/54/37/79.2  VBG Lactate: 2.5  Venous Blood Gas:  11-18 @ 20:30  7.21/> 110/70/39/88.9  VBG Lactate: 2.7      MICROBIOLOGY:     RADIOLOGY:  [ ] Reviewed and interpreted by me    PULMONARY FUNCTION TESTS:    EKG:

## 2017-11-21 PROCEDURE — 99233 SBSQ HOSP IP/OBS HIGH 50: CPT | Mod: GC

## 2017-11-21 RX ADMIN — HYDROMORPHONE HYDROCHLORIDE 0.5 MILLIGRAM(S): 2 INJECTION INTRAMUSCULAR; INTRAVENOUS; SUBCUTANEOUS at 12:35

## 2017-11-21 RX ADMIN — HYDROMORPHONE HYDROCHLORIDE 0.5 MILLIGRAM(S): 2 INJECTION INTRAMUSCULAR; INTRAVENOUS; SUBCUTANEOUS at 21:24

## 2017-11-21 RX ADMIN — HYDROMORPHONE HYDROCHLORIDE 0.5 MILLIGRAM(S): 2 INJECTION INTRAMUSCULAR; INTRAVENOUS; SUBCUTANEOUS at 02:46

## 2017-11-21 RX ADMIN — CHLORHEXIDINE GLUCONATE 1 APPLICATION(S): 213 SOLUTION TOPICAL at 12:28

## 2017-11-21 NOTE — DIETITIAN INITIAL EVALUATION ADULT. - OTHER INFO
Nutrition consult received on 11/19/17 for unintentional wt. loss prior to admission. Pt. was recently in hospital and nutrition assessment completed on 11/14/17 . Discharged on comfort care , currently pt. DNR ; poor prognosis per medical noted on comfort care , nutrition intervention not appropriate . Provide PO nutrition as tolerated and consistent w/ GOC.

## 2017-11-21 NOTE — PROGRESS NOTE ADULT - SUBJECTIVE AND OBJECTIVE BOX
CHIEF COMPLAINT:    Interval Events:    REVIEW OF SYSTEMS:  Constitutional:   Eyes:  ENT:  CV:  Resp:  GI:  :  MSK:  Integumentary:  Neurological:  Psychiatric:  Endocrine:  Hematologic/Lymphatic:  Allergic/Immunologic:  [ ] All other systems negative  [ ] Unable to assess ROS because ________    OBJECTIVE:  ICU Vital Signs Last 24 Hrs  T(C): 36.4 (21 Nov 2017 05:26), Max: 37 (20 Nov 2017 14:07)  T(F): 97.6 (21 Nov 2017 05:26), Max: 98.6 (20 Nov 2017 14:07)  HR: 112 (21 Nov 2017 05:26) (112 - 123)  BP: 83/61 (21 Nov 2017 05:26) (83/61 - 102/62)  BP(mean): --  ABP: --  ABP(mean): --  RR: 17 (21 Nov 2017 05:26) (17 - 32)  SpO2: 96% (21 Nov 2017 05:26) (94% - 96%)        CAPILLARY BLOOD GLUCOSE            HOSPITAL MEDICATIONS:  MEDICATIONS  (STANDING):  chlorhexidine 4% Liquid 1 Application(s) Topical daily  heparin  Injectable 5000 Unit(s) SubCutaneous every 8 hours  sodium chloride 0.9%. 1000 milliLiter(s) (30 mL/Hr) IV Continuous <Continuous>    MEDICATIONS  (PRN):  HYDROmorphone  Injectable 0.5 milliGRAM(s) IV Push every 2 hours PRN dyspnea      LABS:                        9.9    10.39 )-----------( 413      ( 20 Nov 2017 04:45 )             38.4     11-20    148<H>  |  98  |  44<H>  ----------------------------<  67<L>  6.0<H>   |  37<H>  |  1.67<H>    Ca    9.3      20 Nov 2017 04:45  Phos  6.5     11-20  Mg     2.2     11-20    TPro  6.8  /  Alb  2.9<L>  /  TBili  0.5  /  DBili  x   /  AST  70<H>  /  ALT  30  /  AlkPhos  282<H>  11-19          Venous Blood Gas:  11-19 @ 09:00  7.22/> 110/90/38/95.9  VBG Lactate: 1.9      MICROBIOLOGY:     RADIOLOGY:  [ ] Reviewed and interpreted by me    PULMONARY FUNCTION TESTS:    EKG: CHIEF COMPLAINT:   no complaints, responsive to verbal at times    Interval Events: mild hypotension overnight    REVIEW OF SYSTEMS:  CV: denies  Resp: denies  [x] All other systems negative      OBJECTIVE:  ICU Vital Signs Last 24 Hrs  T(C): 36.4 (21 Nov 2017 05:26), Max: 37 (20 Nov 2017 14:07)  T(F): 97.6 (21 Nov 2017 05:26), Max: 98.6 (20 Nov 2017 14:07)  HR: 112 (21 Nov 2017 05:26) (112 - 123)  BP: 83/61 (21 Nov 2017 05:26) (83/61 - 102/62)  RR: 17 (21 Nov 2017 05:26) (17 - 32)  SpO2: 96% (21 Nov 2017 05:26) (94% - 96%)      HOSPITAL MEDICATIONS:  MEDICATIONS  (STANDING):  chlorhexidine 4% Liquid 1 Application(s) Topical daily  heparin  Injectable 5000 Unit(s) SubCutaneous every 8 hours  sodium chloride 0.9%. 1000 milliLiter(s) (30 mL/Hr) IV Continuous <Continuous>    MEDICATIONS  (PRN):  HYDROmorphone  Injectable 0.5 milliGRAM(s) IV Push every 2 hours PRN dyspnea

## 2017-11-22 PROCEDURE — 99233 SBSQ HOSP IP/OBS HIGH 50: CPT | Mod: GC

## 2017-11-22 RX ADMIN — HYDROMORPHONE HYDROCHLORIDE 0.5 MILLIGRAM(S): 2 INJECTION INTRAMUSCULAR; INTRAVENOUS; SUBCUTANEOUS at 10:51

## 2017-11-22 RX ADMIN — CHLORHEXIDINE GLUCONATE 1 APPLICATION(S): 213 SOLUTION TOPICAL at 10:52

## 2017-11-22 RX ADMIN — HYDROMORPHONE HYDROCHLORIDE 0.5 MILLIGRAM(S): 2 INJECTION INTRAMUSCULAR; INTRAVENOUS; SUBCUTANEOUS at 19:39

## 2017-11-22 RX ADMIN — HYDROMORPHONE HYDROCHLORIDE 0.5 MILLIGRAM(S): 2 INJECTION INTRAMUSCULAR; INTRAVENOUS; SUBCUTANEOUS at 04:39

## 2017-11-22 NOTE — PROGRESS NOTE ADULT - SUBJECTIVE AND OBJECTIVE BOX
CHIEF COMPLAINT: Patient is a 57y old  Female who presents with a chief complaint of Shortness of breath (19 Nov 2017 03:19)    Interval Events:      REVIEW OF SYSTEMS:  Constitutional:   Eyes:  ENT:  CV:  Resp:  GI:  :  MSK:  Integumentary:  Neurological:  Psychiatric:  Endocrine:  Hematologic/Lymphatic:  Allergic/Immunologic:  [ ] All other systems negative  [ ] Unable to assess ROS because ________      OBJECTIVE:  ICU Vital Signs Last 24 Hrs  T(C): 36.4 (22 Nov 2017 04:37), Max: 36.8 (21 Nov 2017 21:23)  T(F): 97.5 (22 Nov 2017 04:37), Max: 98.2 (21 Nov 2017 21:23)  HR: 110 (22 Nov 2017 07:49) (110 - 133)  BP: 104/64 (22 Nov 2017 04:37) (94/75 - 127/70)  BP(mean): --  ABP: --  ABP(mean): --  RR: 32 (22 Nov 2017 04:37) (17 - 32)  SpO2: 93% (22 Nov 2017 07:49) (93% - 98%)      HOSPITAL MEDICATIONS:  MEDICATIONS  (STANDING):  chlorhexidine 4% Liquid 1 Application(s) Topical daily  sodium chloride 0.9%. 1000 milliLiter(s) (30 mL/Hr) IV Continuous <Continuous>    MEDICATIONS  (PRN):  HYDROmorphone  Injectable 0.5 milliGRAM(s) IV Push every 2 hours PRN dyspnea CHIEF COMPLAINT: Patient is a 57y old  Female who presents with a chief complaint of Shortness of breath (19 Nov 2017 03:19)    Interval Events: No events overnight    REVIEW OF SYSTEMS:  Constitutional:   Eyes:  ENT:  CV:  Resp:  GI:  :  MSK:  Integumentary:  Neurological:  Psychiatric:  Endocrine:  Hematologic/Lymphatic:  Allergic/Immunologic:  [ ] All other systems negative  [ x ] Unable to assess ROS because non verbal today      OBJECTIVE:  ICU Vital Signs Last 24 Hrs  T(C): 36.4 (22 Nov 2017 04:37), Max: 36.8 (21 Nov 2017 21:23)  T(F): 97.5 (22 Nov 2017 04:37), Max: 98.2 (21 Nov 2017 21:23)  HR: 110 (22 Nov 2017 07:49) (110 - 133)  BP: 104/64 (22 Nov 2017 04:37) (94/75 - 127/70)  BP(mean): --  ABP: --  ABP(mean): --  RR: 32 (22 Nov 2017 04:37) (17 - 32)  SpO2: 93% (22 Nov 2017 07:49) (93% - 98%)      HOSPITAL MEDICATIONS:  MEDICATIONS  (STANDING):  chlorhexidine 4% Liquid 1 Application(s) Topical daily  sodium chloride 0.9%. 1000 milliLiter(s) (30 mL/Hr) IV Continuous <Continuous>    MEDICATIONS  (PRN):  HYDROmorphone  Injectable 0.5 milliGRAM(s) IV Push every 2 hours PRN dyspnea CHIEF COMPLAINT: Patient is a 57y old  Female who presents with a chief complaint of Shortness of breath (19 Nov 2017 03:19)    Interval Events: No events overnight    REVIEW OF SYSTEMS:  Constitutional:   Eyes:  ENT:  CV:  Resp:  GI:  :  MSK:  Integumentary:  Neurological:  Psychiatric:  Endocrine:  Hematologic/Lymphatic:  Allergic/Immunologic:  [ ] All other systems negative  [ x ] Unable to assess ROS because non verbal       OBJECTIVE:  ICU Vital Signs Last 24 Hrs  T(C): 36.4 (22 Nov 2017 04:37), Max: 36.8 (21 Nov 2017 21:23)  T(F): 97.5 (22 Nov 2017 04:37), Max: 98.2 (21 Nov 2017 21:23)  HR: 110 (22 Nov 2017 07:49) (110 - 133)  BP: 104/64 (22 Nov 2017 04:37) (94/75 - 127/70)  BP(mean): --  ABP: --  ABP(mean): --  RR: 32 (22 Nov 2017 04:37) (17 - 32)  SpO2: 93% (22 Nov 2017 07:49) (93% - 98%)      HOSPITAL MEDICATIONS:  MEDICATIONS  (STANDING):  chlorhexidine 4% Liquid 1 Application(s) Topical daily  sodium chloride 0.9%. 1000 milliLiter(s) (30 mL/Hr) IV Continuous <Continuous>    MEDICATIONS  (PRN):  HYDROmorphone  Injectable 0.5 milliGRAM(s) IV Push every 2 hours PRN dyspnea

## 2017-11-22 NOTE — PROVIDER CONTACT NOTE (OTHER) - ASSESSMENT
patient remains poorly responsive, on continuous bipap 80%. HOB elevated. RR-28. patient has order for dilaudid prn for dyspnea. patient remains poorly responsive, on continuous bipap 80%. HOB elevated. RR-28. patient has order for dilaudid prn for dyspnea. b/p-88/55

## 2017-11-23 VITALS
DIASTOLIC BLOOD PRESSURE: 55 MMHG | RESPIRATION RATE: 35 BRPM | OXYGEN SATURATION: 93 % | TEMPERATURE: 99 F | SYSTOLIC BLOOD PRESSURE: 92 MMHG | HEART RATE: 110 BPM

## 2017-11-23 PROCEDURE — 99233 SBSQ HOSP IP/OBS HIGH 50: CPT | Mod: GC

## 2017-11-23 RX ADMIN — HYDROMORPHONE HYDROCHLORIDE 0.5 MILLIGRAM(S): 2 INJECTION INTRAMUSCULAR; INTRAVENOUS; SUBCUTANEOUS at 11:24

## 2017-11-23 RX ADMIN — SODIUM CHLORIDE 30 MILLILITER(S): 9 INJECTION INTRAMUSCULAR; INTRAVENOUS; SUBCUTANEOUS at 05:27

## 2017-11-23 RX ADMIN — HYDROMORPHONE HYDROCHLORIDE 0.5 MILLIGRAM(S): 2 INJECTION INTRAMUSCULAR; INTRAVENOUS; SUBCUTANEOUS at 05:26

## 2017-11-23 RX ADMIN — CHLORHEXIDINE GLUCONATE 1 APPLICATION(S): 213 SOLUTION TOPICAL at 11:24

## 2017-11-23 NOTE — PROGRESS NOTE ADULT - ASSESSMENT
Patient is a 56 y/o F w/ a PMHx of metastatic colon cancer with mets to liver and lung (on 4-5L home O2) and recent admission for hypoxic/hypercarbic respiratory failure who presented to the ED with shortness of breath, admitted for acute hypoxic/hypercarbic respiratory failure, now on BiPAP.
Patient is a 56 y/o F w/ a PMHx of metastatic colon cancer with mets to liver and lung (on 4-5L home O2) and recent admission for hypoxic/hypercarbic respiratory failure who presented to the ED with shortness of breath, admitted for acute hypoxic/hypercarbic respiratory failure, now on BiPAP. Patient is actively dying.

## 2017-11-23 NOTE — PROGRESS NOTE ADULT - PROBLEM SELECTOR PROBLEM 2
Colon cancer metastasized to multiple sites

## 2017-11-23 NOTE — PROVIDER CONTACT NOTE (OTHER) - SITUATION
BP 94/75  RR 28-30 on continuous bipap
BP 94/61
PT BP is 88/57 and HR is 116
Pt BP 83/61
Pt has not urinated in more than 24 hours.
Pt's BP is 83/49 and HR is 109
Pt's BP os 92/58 HR is 114
SBP <100, RR>22

## 2017-11-23 NOTE — PROGRESS NOTE ADULT - SUBJECTIVE AND OBJECTIVE BOX
CHIEF COMPLAINT:    Interval Events:    REVIEW OF SYSTEMS:  Constitutional:   Eyes:  ENT:  CV:  Resp:  GI:  :  MSK:  Integumentary:  Neurological:  Psychiatric:  Endocrine:  Hematologic/Lymphatic:  Allergic/Immunologic:  [ ] All other systems negative  [ ] Unable to assess ROS because ________    OBJECTIVE:  ICU Vital Signs Last 24 Hrs  T(C): 37.1 (23 Nov 2017 05:00), Max: 37.2 (22 Nov 2017 13:21)  T(F): 98.8 (23 Nov 2017 05:00), Max: 99 (22 Nov 2017 13:21)  HR: 114 (23 Nov 2017 06:21) (109 - 117)  BP: 92/58 (23 Nov 2017 05:00) (83/48 - 92/58)  BP(mean): --  ABP: --  ABP(mean): --  RR: 30 (23 Nov 2017 05:00) (21 - 32)  SpO2: 93% (23 Nov 2017 06:21) (88% - 96%)        CAPILLARY BLOOD GLUCOSE          PHYSICAL EXAM:  General:   HEENT:   Lymph Nodes:  Neck:   Respiratory:   Cardiovascular:   Abdomen:   Extremities:   Skin:   Neurological:  Psychiatry:    HOSPITAL MEDICATIONS:  MEDICATIONS  (STANDING):  chlorhexidine 4% Liquid 1 Application(s) Topical daily  sodium chloride 0.9%. 1000 milliLiter(s) (30 mL/Hr) IV Continuous <Continuous>    MEDICATIONS  (PRN):  HYDROmorphone  Injectable 0.5 milliGRAM(s) IV Push every 2 hours PRN dyspnea      LABS:                    MICROBIOLOGY:     RADIOLOGY:  [ ] Reviewed and interpreted by me    PULMONARY FUNCTION TESTS:    EKG: CHIEF COMPLAINT: Patient is a 57y old  Female who presents with a chief complaint of Shortness of breath (19 Nov 2017 03:19)    Interval Events: No acute events overnight    REVIEW OF SYSTEMS:  Constitutional:   Eyes:  ENT:  CV:  Resp:  GI:  :  MSK:  Integumentary:  Neurological:  Psychiatric:  Endocrine:  Hematologic/Lymphatic:  Allergic/Immunologic:  [ ] All other systems negative  [x] Unable to assess ROS because ________unresponsive and unable to communicate    OBJECTIVE:  ICU Vital Signs Last 24 Hrs  T(C): 37.1 (23 Nov 2017 05:00), Max: 37.2 (22 Nov 2017 13:21)  T(F): 98.8 (23 Nov 2017 05:00), Max: 99 (22 Nov 2017 13:21)  HR: 114 (23 Nov 2017 06:21) (109 - 117)  BP: 92/58 (23 Nov 2017 05:00) (83/48 - 92/58)  BP(mean): --  ABP: --  ABP(mean): --  RR: 30 (23 Nov 2017 05:00) (21 - 32)  SpO2: 93% (23 Nov 2017 06:21) (88% - 96%)        CAPILLARY BLOOD GLUCOSE    HOSPITAL MEDICATIONS:  MEDICATIONS  (STANDING):  chlorhexidine 4% Liquid 1 Application(s) Topical daily  sodium chloride 0.9%. 1000 milliLiter(s) (30 mL/Hr) IV Continuous <Continuous>    MEDICATIONS  (PRN):  HYDROmorphone  Injectable 0.5 milliGRAM(s) IV Push every 2 hours PRN dyspnea      LABS:      MICROBIOLOGY:     RADIOLOGY:  [ ] Reviewed and interpreted by me    PULMONARY FUNCTION TESTS:    EKG:

## 2017-11-23 NOTE — PROVIDER CONTACT NOTE (OTHER) - ACTION/TREATMENT ORDERED:
ok to give dilaudid prn for dyspnea.
Continue to monitor
No action. Continue to monitor
No intervention at this time
No intervention at this time
No intervention at this time.
Reassess RR in an hour. May give dilaudid to keep patient comfortable if still tachypneic.
Stated no bladder or straight cath necessary. Continue to monitor

## 2017-11-23 NOTE — PROGRESS NOTE ADULT - PROBLEM SELECTOR PLAN 1
- Patient recently hospitalized from 11/11-11/15 for acute hypoxic/hypercarbic respiratory failure which improved s/p BiPAP. Patient returns with worsening dyspnea, fatigue, and confusion over the last 3-days. Patient found to be hypoxic to 85% in the ED with VBG showing hypercapnic respiratory failure (pH = 7.18, pCO2 = > 110). Respiratory failire is likely 2/2 worsening lung metastases a/w poor lung reserve volume. Patient started on BiPAP in the ED and admitted to RCU.  - Per chart review, GOC discussion during patient's last admission revealed that patient was DNI, but not DNR. GOC again reviewed with patient's daughter who prefers to keep patient DNI w/o DNR for now.  - Will keep patient on BiPAP and monitor for improvement in hypercapnia and mental status   - C/w pulse oximetry  - Monitor blood gas
-BIPAP as tolerated  -Dilaudid for dyspnea
-Continuous BIPAP as tolerated  -Dilaudid PRN for dyspnea
-Continuous BIPAP as tolerated  -Dilaudid PRN for dyspnea
-BIPAP as tolerated  -Dilaudid for dyspnea

## 2017-11-23 NOTE — PROGRESS NOTE ADULT - MENTAL STATUS
Unresponsive
Lethargic. Increasing lethargy over the course of the day.
lethargic, arouses to repeated verba / tactile stimuli, able to respond to yes or no questions

## 2017-11-23 NOTE — PROGRESS NOTE ADULT - CARDIOVASCULAR DETAILS
tachycardia
tachycardia/positive S2/positive S1
tachycardia/reg rhythm
tachycardia/positive S2/positive S1

## 2017-11-23 NOTE — PROGRESS NOTE ADULT - SKIN COMMENTS
See [facility] flow sheet for skin details
refer to [facility] for full report
See [facility] flow sheet for skin details

## 2017-11-23 NOTE — PROGRESS NOTE ADULT - RS GEN PE MLT RESP DETAILS PC
breath sounds equal
rhonchi/breath sounds equal/airway patent
respirations labored/continuous BIPAP, coarse breath sounds bilaterally
scattered throughout/rhonchi

## 2017-11-23 NOTE — PROGRESS NOTE ADULT - ATTENDING COMMENTS
End stage metastatic colon cancer  Morphine for comfort, solumedrol added to assist with dyspnea
pt seen and examined     d/w the team    comfort measures  prognosis poor
As above    End stage carcinoma, inducing resp failure  Supportive measures as pt is DNI  Palliative consult    Renzo Honeycutt MD-Pulmonary   [phone]
Comfort measures, bilevel for comfort
End stage colon cancer on comfort measures

## 2017-11-23 NOTE — DISCHARGE NOTE FOR THE EXPIRED PATIENT - HOSPITAL COURSE
Patient is a 56 y/o F w/ a PMHx of metastatic colon cancer with mets to liver and lung (on 4-5L home O2) and recent admission for hypoxic/hypercarbic respiratory failure who presented to the ED with shortness of breath. Unable to obtain history from patient as she was somnolent at time of encounter. History was obtained per chart review and from patient's daughter/HCP over the phone. Patient was recently admitted to the RCU on 11/11-11/15 for acute hypoxic/hypercarbic respiratory failure. Chest CTA at that time was negative for PE and RVP was negative. Patient was started on BiPAP during hospitalization with improvement in her symptoms. During patient's hospital course, she was treated with Diltiazem and Metoprolol for atrial tachycardia. Per patient's daughter, patient was initially doing well on day of discharge. However, the following day, patient was noted to become more drowsy and fatigued. She also began to doze off more often and became more confused. Patient's drowsiness/confusion progressed over the next few days and she started to develop worsening shortness of breath despite being on 4-5L NC on home O2. Patient's daughter became concerned and so she brought the patient back to the ED for further evaluation. Per patient's daughter, patient has not had any fever, chills, chest pain, cough, or abdominal pain since discharge. No recent falls. Patient has had poor PO intake recently as well. Patient's daughter reports that patient took her home Lasix (20mg QD) on day of presentation and the day before, but she states that patient did not take any doses of Diltiazem or Lopressor.    Patient was placed on BIPAP continuously during this admission. Comfort measures were put in place after discussion with her family.    Expiration. Patient unresponsive to verbal or tactile stimuli. No heart or breath sounds. Patient noted to be without pulse at carotid or femoral arteries. No spontaneous breathing. Pupils are fixed and constricted. Family was present and notified of expiration. Dr. Martel notified.

## 2017-11-23 NOTE — PROGRESS NOTE ADULT - PROBLEM SELECTOR PROBLEM 1
Acute on chronic respiratory failure with hypercapnia

## 2017-11-23 NOTE — PROVIDER CONTACT NOTE (OTHER) - BACKGROUND
HX: Colon cancer, mets to lungs and liver
HX: Colon cancer, mets to the lungs and liver
HX: colon cancer, mets to lungs and liver
Pt remains on continous Bi-pap
Pt remains on continuous Bi-pap
Pt's BP is 92/58 and 
acute on chronic respiratory failure with hypercapniea. hx of colon cancer mets to liver & lung
malignant neoplasm, ascites, liver cirrhosis, dm, htn

## 2017-11-23 NOTE — PROVIDER CONTACT NOTE (OTHER) - RECOMMENDATIONS
provider notified.
ADS notified and made aware
Bladder scan and possible straight cath
Dialudid given for the breathing and HR
Increase mL/hr of running NS
Monitor pt
Provider to make aware
Provider to make aware

## 2017-11-23 NOTE — PROGRESS NOTE ADULT - PROBLEM SELECTOR PLAN 2
- Patient with a history of colon Ca w/ mets to lung and liver. She follows with Oncology at [facility]. Her last chemotherapy was on 9/22  - Will notify patient's Oncologist of patient's admission in the AM
Comfort measures
-Comfort measures
-Comfort measures
Comfort measures

## 2017-12-12 NOTE — DIETITIAN INITIAL EVALUATION ADULT. - PROBLEM SELECTOR PLAN 4
Subjective:       Patient ID: Sae Santiago is a 46 y.o. male.    Chief Complaint: Annual Exam and Medication Refill    Annual exam:       Pt is a 46 year old with anxiety and HTN. Pt BP is well controlled and doing well on the buspar.      Medication Refill   This is a new problem. The current episode started in the past 7 days. The problem has been gradually improving. Associated symptoms include neck pain. Pertinent negatives include no abdominal pain, arthralgias, chest pain, fatigue, headaches, joint swelling, vomiting or weakness. Nothing aggravates the symptoms. He has tried nothing for the symptoms. The treatment provided moderate relief.     Review of Systems   Constitutional: Negative for activity change, fatigue and unexpected weight change.   HENT: Positive for rhinorrhea. Negative for hearing loss and trouble swallowing.    Eyes: Negative for discharge and visual disturbance.   Respiratory: Negative for chest tightness and wheezing.    Cardiovascular: Negative for chest pain and palpitations.   Gastrointestinal: Negative for abdominal pain, blood in stool, constipation, diarrhea and vomiting.   Endocrine: Negative for polydipsia and polyuria.   Genitourinary: Negative for difficulty urinating, hematuria and urgency.   Musculoskeletal: Positive for neck pain. Negative for arthralgias and joint swelling.   Neurological: Negative for weakness and headaches.   Psychiatric/Behavioral: Negative for confusion and dysphoric mood.       Objective:      Physical Exam   Constitutional: He is oriented to person, place, and time. He appears well-developed and well-nourished.   HENT:   Head: Normocephalic.   Eyes: EOM are normal. Pupils are equal, round, and reactive to light.   Neck: Normal range of motion. Neck supple. No JVD present. No thyromegaly present.   Cardiovascular: Normal rate and regular rhythm.    Pulmonary/Chest: Effort normal and breath sounds normal.   Abdominal: Soft. Bowel sounds are  normal.   Musculoskeletal: Normal range of motion. He exhibits no edema.   Lymphadenopathy:     He has no cervical adenopathy.   Neurological: He is alert and oriented to person, place, and time. He has normal reflexes.   Skin: Skin is warm and dry.   Psychiatric: He has a normal mood and affect. His behavior is normal.       Assessment:       1. Annual physical exam    2. Hyperlipidemia, unspecified hyperlipidemia type        Plan:       Annual physical exam  Comments:  Will do CBC, CMP, lipid and PSA  Orders:  -     CBC auto differential; Future; Expected date: 12/12/2017  -     Comprehensive metabolic panel; Future; Expected date: 12/12/2017  -     Lipid panel; Future; Expected date: 12/12/2017  -     PSA, Screening; Future; Expected date: 12/12/2017    Hyperlipidemia, unspecified hyperlipidemia type  Comments:  Pt continues on the lipitor and fish oil  Orders:  -     hydroCHLOROthiazide (MICROZIDE) 12.5 mg capsule; Take 1 capsule (12.5 mg total) by mouth once daily.  Dispense: 90 capsule; Refill: 3    Other orders  -     busPIRone (BUSPAR) 10 MG tablet; Take 1 tablet (10 mg total) by mouth 3 (three) times daily.  Dispense: 270 tablet; Refill: 2  -     LORazepam (ATIVAN) 0.5 MG tablet; Take 1 tablet (0.5 mg total) by mouth every evening.  Dispense: 30 tablet; Refill: 0  -     metoprolol succinate (TOPROL-XL) 25 MG 24 hr tablet; Take 1 1/2 tablet a night  Dispense: 135 tablet; Refill: 3  -     losartan (COZAAR) 100 MG tablet; Take 1 tablet (100 mg total) by mouth once daily.  Dispense: 90 tablet; Refill: 3  -     atorvastatin (LIPITOR) 40 MG tablet; Take 1 tablet (40 mg total) by mouth once daily.  Dispense: 90 tablet; Refill: 3          - Patient found to have a leukocytosis of 12.85 on presentation. Of note, patient's WBC is grossly unchanged since recent discharge and she was recently on steroids. Patient is currently afebrile and w/o s/s infection. Will monitor off abx for now.  - Monitor CBC

## 2018-12-18 LAB
ALBUMIN SERPL ELPH-MCNC: 4.1 G/DL
ALP BLD-CCNC: 158 U/L
ALT SERPL-CCNC: 23 U/L
ANION GAP SERPL CALC-SCNC: 18 MMOL/L
AST SERPL-CCNC: 32 U/L
BILIRUB SERPL-MCNC: 0.3 MG/DL
BUN SERPL-MCNC: 14 MG/DL
CALCIUM SERPL-MCNC: 9.6 MG/DL
CEA SERPL-MCNC: 21.2 NG/ML
CHLORIDE SERPL-SCNC: 104 MMOL/L
CO2 SERPL-SCNC: 22 MMOL/L
CREAT SERPL-MCNC: 0.78 MG/DL
GLUCOSE SERPL-MCNC: 78 MG/DL
MAGNESIUM SERPL-MCNC: 2 MG/DL
POTASSIUM SERPL-SCNC: 4.6 MMOL/L
PROT SERPL-MCNC: 7.7 G/DL
SODIUM SERPL-SCNC: 144 MMOL/L

## 2019-04-04 NOTE — DIETITIAN INITIAL EVALUATION ADULT. - NS AS NUTRI INTERV MEALS SNACK
Return to work    4/4/2019      RE:    Vlad Hernandez   [address] [place] [address]      This is to certify that Vlad was seen in the clinic today and is excused from work until further evaluation. Patient has a follow-up appointment in one week.      Signature: __________________________________________________, 4/4/2019              Ananth Ricks, [facility]  [address] [place] [address] [phone]     as tolerated and consistent with GOC/General/healthful diet

## 2019-08-27 NOTE — ED PROVIDER NOTE - CPE EDP GU CVA TENDER
[FreeTextEntry1] : F/U VISIT OF 69 Y OLD FEM WITH PMX OF IFG,HTN AND DYSLIPIDEMIA= LABS ORDERED,CONTINUE CURRENT EDS \par GERD/ABD PAIN = F/U GI \par RTO IN 6 M FOR CPE
no tenderness

## 2020-08-10 NOTE — H&P ADULT - NSHPRISKHIVSCREEN_GEN_ALL_CORE
Health Maintenance Due   Topic Date Due   • Shingles Vaccine (1 of 2) 05/20/1979   • Medicare Wellness Visit  01/28/2021   • Depression Screening  01/28/2021       Patient is due for the topics as listed above and wishes to proceed with them. Not yet due for above.         Offered and patient declined

## 2020-09-16 NOTE — H&P ADULT - NSHPATTENDINGPLANDISCUSS_GEN_ALL_CORE
I would recommend rescheduling it 2 weeks later than it was supposed to be.   patient, pulmonary fellow at bedside, NP Leidy: [phone]

## 2021-11-14 NOTE — PROGRESS NOTE ADULT - CONSTITUTIONAL DETAILS
Westley Frank is a 69year old male. HPI:     CC:  Patient presents with:  Lesion: Father hx of skin ca, unknown type. Pt hx of [address] [email or web address]  LOV 9/21/17. Pt presents for lesion to the left leg. Pt has had spot for about 10 yrs or more.   Will appear and get red t
hemorrhage     left eye   • Visual impairment     glasses     Past Surgical History:   Procedure Laterality Date   • CATARACT     • COLONOSCOPY N/A 6/13/2018    Procedure: COLONOSCOPY;  Surgeon: Bruna Eisenmenger, MD;  Location: [address] [place] [address] ENDOSCOPY   • [address] [place] [address]
notes/ records and appropriate/relevant lab results including pathology and past body maps reviewed. Updated and new information noted in current visit.      Last visit 9/20/2017 family history of skin cancer, patient with history of BCC nasal dorsum 2017
No evidence of recurrence. No new skin cancer. BCC right nasal sidewall post excision with Dr. Jaime Garcia 2017    Moderate sun damage. Lots of sun exposure in the past.    Papule reddish-yellow 4 x 6 mm right preauricular cheek. Asymptomatic. Observe.  Cons
body map, plan, counseling 10minutes My total time spent caring for the patient on the day of the encounter: 30 minutes   The patient indicates understanding of these issues and agrees to the plan.   The patient is asked to return as noted in follow-up/ abo
respiratory distress
no distress
respiratory distress

## 2021-12-12 NOTE — ED PROVIDER NOTE - ATTENDING CONTRIBUTION TO CARE
unresponsive Lauro VICENTE- 56 Y/O F with h/o colon ca with mets to liver and lung ca on chemo, last chemo on Thursday p/w dizziness, feeling weak, tired and about to pass out with sob.No fever, chills, chest pain, sputum production, dysuria or constipation. No back pain, weakness, numbness    Pt is alert, well appearing female, s1s2 reg tachy, b/l coarse breath sounds, abd soft, nt, nd, normal breath sounds, neuro exam aox3, cn 2-12 intact, no pronator drift, peerl eomi, skin warm dyr, good turgor    Ddx: sepsis, pe, dehydration

## 2022-07-02 NOTE — ED ADULT TRIAGE NOTE - CHIEF COMPLAINT QUOTE
Pt arrives on 100% NRB mask..pt st" Since Wednesday I been feeling short of breath and the tonight having chest pain" As per EMT" Pox on 3lnc was 82%" Hx of colon ca with mets. Last Chemo Sept
Yes

## 2023-07-10 NOTE — ED ADULT NURSE NOTE - NS ED NOTE ABUSE RESPONSE YN
[FreeTextEntry1] : 32 yo m, s/p lap appendectomy for perforated appendicitis.\par - Normal postoperative course.\par - Drain d/c'd at bedside, staples removed.\par - Pathology, appendicitis with lai appendicitis.\par - Op report and pathology reviewed with patient.\par - RTC PRN.
Yes

## 2023-09-08 NOTE — ED PROVIDER NOTE - NS ED MD DISPO DIVISION
A MY CHART MESSAGE HAS BEEN SENT TO THE PATIENT FOR [facility] [place] [facility].      
VIDHI

## 2024-05-03 NOTE — ED ADULT NURSE NOTE - PAIN RATING/NUMBER SCALE (0-10): ACTIVITY
PATIENT INSTRUCTIONS  POST-SEDATION        IMMEDIATELY FOLLOWING PROCEDURE:    Do not drive or operate machinery for the first twenty four hours after surgery.     Do not make any important decisions for twenty four hours after surgery or while taking narcotic pain medications or sedatives.     You should NOT BE LEFT UNATTENDED OR ALONE. A responsible adult should be with you for the rest of the day of your procedure and also during the night for your protection and safety.    You may experience some light headedness. Rest at home with activity as tolerated. You may not need to go to bed, but it is important to rest for the next 24 hours. You should not engage in athletic sports such as basketball, volleyball, jogging, skating, or activities requiring refined motor skills for 24 hours.   If you develop intractable nausea and vomiting or a severe headache please notify your doctor immediately.   You are not expected to have any fever, but if you feel warm, take your temperature. If you have a fever 101 degrees or higher, call your doctor.     If you have had an Endoscopy:   *Eat lightly for your first meal and gradually resume your normal / prescribed diet. DO NOT eat or drink until your gag reflex returns.   *If you have a sore throat you may use lozenges, or salt water gargles.      ONCE YOU ARE HOME, IF YOU SHOULD HAVE:  Difficulty in breathing, persistent nausea or vomiting, bleeding you feel is excessive, or pain that is unusual, increased abdominal bloating, or any swelling, fever / chills, call your physician. If you cannot contact your physician, but feel that your signs and symptoms need a physician's attention, go to the Emergency Department.      FOLLOW-UP:    Please follow up with your Primary Care Provider as scheduled or needed.    Call Steve Elizabeth MD if there are any GI concerns. [phone]      You may be receiving a follow up phone call to ask about your care.         Upper GI Endoscopy:  6

## 2024-11-18 NOTE — CHART NOTE - NSCHARTNOTEFT_GEN_A_CORE
Pt found to have HR 150s. At bedside, pt is coughing but denies dizziness, heart palpitations, chest pain. States that having a heart rate this high is normal for her. Ordered EKG and lopressor for pt, though she declined medication. Risks and benefits of taking metoprolol discussed at length with patient. Unclear etiology of tachycardia. CTPA negative for PE. Pt does not appear dyspneic but coughing, which may have triggered a ?vagal response. Can also consider tumor compression of intrinsic pacemaker of the heart causing sudden episodes of tachycardia. Will continue to monitor and will at the very least increase diltiazem to 60 mg q6h.     ----------------------------------------  Nasima Jamison MD PGY-4  Pulmonary/Critical Care Fellow  Pager # [phone] ([facility]), [phone] ([facility]) Time reflects when diagnosis was documented in both MDM as applicable and the Disposition within this note       Time User Action Codes Description Comment    11/18/2024  4:46 PM Delphine Henderson Add [K85.90] Pancreatitis     11/20/2024  8:21 AM Rosana Zuluaga Add [F10.939] Alcohol withdrawal syndrome with complication (HCC)     11/20/2024  8:21 AM Rosana Zuluaga [F10.10] Alcohol abuse           ED Disposition       ED Disposition   Admit    Condition   Stable    Date/Time   Mon Nov 18, 2024  4:46 PM    Comment   Case was discussed with CRISTINO and the patient's admission status was agreed to be Admission Status: inpatient status to the service of Dr. Person .               Assessment & Plan       Medical Decision Making  Sammy Mays is a 64 y.o. who presents with complaints of abdominal pain, N/V/D    Vital signs are initially tachycardic, otherwise HD stable, afebrile    Ddx: Including but not limited to pancreatitis,cholecystitis, foodborne illness, viral GI syndrome, versus other acute intra-abdominal pathology.    Plan: Workup as below showing increased lipase  Patient treated symptomatically for pain  CT showing recurrence of pancreatitis    Disposition: Discussed with Slim.  Admit inpatient.  Patient understands and is agreeable to plan.      Amount and/or Complexity of Data Reviewed  Labs: ordered.  Radiology: ordered and independent interpretation performed.    Risk  OTC drugs.  Prescription drug management.  Decision regarding hospitalization.             Medications   albuterol (PROVENTIL HFA,VENTOLIN HFA) inhaler 2 puff (has no administration in time range)   allopurinol (ZYLOPRIM) tablet 300 mg (has no administration in time range)   loratadine (CLARITIN) tablet 10 mg (has no administration in time range)   FLUoxetine (PROzac) capsule 40 mg (has no administration in time range)   folic acid (FOLVITE) tablet 1 mg (has no administration in time range)   levothyroxine tablet 75 mcg (has no  administration in time range)   multivitamin stress formula tablet 1 tablet (has no administration in time range)   pantoprazole (PROTONIX) EC tablet 40 mg (has no administration in time range)   thiamine tablet 100 mg (has no administration in time range)   lactated ringers infusion (125 mL/hr Intravenous New Bag 11/18/24 8854)   acetaminophen (TYLENOL) tablet 650 mg (has no administration in time range)   polyethylene glycol (MIRALAX) packet 17 g (has no administration in time range)   ondansetron (ZOFRAN) injection 4 mg (has no administration in time range)   enoxaparin (LOVENOX) subcutaneous injection 40 mg (has no administration in time range)   ketorolac (TORADOL) injection 30 mg (has no administration in time range)   traMADol (ULTRAM) tablet 50 mg (has no administration in time range)   ketorolac (TORADOL) injection 15 mg (15 mg Intravenous Given 11/18/24 1144)   acetaminophen (TYLENOL) tablet 650 mg (650 mg Oral Given 11/18/24 1144)   iohexol (OMNIPAQUE) 350 MG/ML injection (MULTI-DOSE) 100 mL (100 mL Intravenous Given 11/18/24 1535)       ED Risk Strat Scores                CIWA-Ar Score       Row Name 11/20/24 08:09:51 11/20/24 0600 11/19/24 2346       CIWA-Ar    Blood Pressure -- 125/72 112/70    Pulse -- 63 --    Nausea and Vomiting 0 0 0    Tactile Disturbances 0 0 0    Tremor 0 0 0    Auditory Disturbances 0 0 0    Paroxysmal Sweats 0 0 0    Visual Disturbances 0 0 0    Anxiety 0 0 0    Headache, Fullness in Head 0 0 0    Agitation 0 0 0    Orientation and Clouding of Sensorium 0 0 0    CIWA-Ar Total 0 0 0      Row Name 11/19/24 1945 11/19/24 14:59:35 11/19/24 0600       CIWA-Ar    Blood Pressure 112/70 -- --    Nausea and Vomiting 0 0 0    Tactile Disturbances 0 0 0    Tremor 0 0 0    Auditory Disturbances 0 0 0    Paroxysmal Sweats 0 0 0    Visual Disturbances 0 0 0    Anxiety 0 0 0    Headache, Fullness in Head 0 0 0    Agitation 0 0 0    Orientation and Clouding of Sensorium 0 0 0    CIWA-Ar Total  0 0 0      Row Name 11/19/24 0330 11/18/24 22:45:26 11/18/24 1900       CIWA-Ar    Pulse 70 -- --    Nausea and Vomiting 0 0 0    Tactile Disturbances 0 0 0    Tremor 0 0 0    Auditory Disturbances 0 0 0    Paroxysmal Sweats 0 0 0    Visual Disturbances 0 0 0    Anxiety 0 0 0    Headache, Fullness in Head 0 0 0    Agitation 0 0 0    Orientation and Clouding of Sensorium 0 0 0    CIWA-Ar Total 0 0 0                    Identification of Seniors at Risk      Flowsheet Row Most Recent Value   (ISAR) Identification of Seniors at Risk    Before the illness or injury that brought you to the Emergency, did you need someone to help you on a regular basis? 0 Filed at: 11/18/2024 1117   In the last 24 hours, have you needed more help than usual? 0 Filed at: 11/18/2024 1117   Have you been hospitalized for one or more nights during the past 6 months? 1 Filed at: 11/18/2024 1117   In general, do you see well? 0 Filed at: 11/18/2024 1117   In general, do you have serious problems with your memory? 1 Filed at: 11/18/2024 1117   Do you take more than three different medications every day? 1 Filed at: 11/18/2024 1117   ISAR Score 3 Filed at: 11/18/2024 1117                SBIRT 22yo+      Flowsheet Row Most Recent Value   Initial Alcohol Screen: US AUDIT-C     1. How often do you have a drink containing alcohol? 6 Filed at: 11/18/2024 1117   2. How many drinks containing alcohol do you have on a typical day you are drinking?  2 Filed at: 11/18/2024 1117   3a. Male UNDER 65: How often do you have five or more drinks on one occasion? 0 Filed at: 11/18/2024 1117   3b. FEMALE Any Age, or MALE 65+: How often do you have 4 or more drinks on one occassion? 0 Filed at: 11/18/2024 1119   Audit-C Score 0 Filed at: 11/18/2024 1119   Full Alcohol Screen: US AUDIT    4. How often during the last year have you found that you were not able to stop drinking once you had started? 0 Filed at: 11/18/2024 1119   5. How often during past year have you  failed to do what was normally expected of you because of drinking?  0 Filed at: 11/18/2024 1119   6. How often in past year have you needed a first drink in the morning to get yourself going after a heavy drinking session?  0 Filed at: 11/18/2024 1119   7. How often in past year have you had feeling of guilt or remorse after drinking?  0 Filed at: 11/18/2024 1119   8. How often in past year have you been unable to remember what happened night before because you had been drinking?  0 Filed at: 11/18/2024 1119   9. Have you or someone else been injured as a result of your drinking?  0 Filed at: 11/18/2024 1119   10. Has a relative, friend, doctor or other health worker been concerned about your drinking and suggested you cut down?  0 Filed at: 11/18/2024 1119   AUDIT Total Score 0 Filed at: 11/18/2024 1119   JAZZ: How many times in the past year have you...    Used an illegal drug or used a prescription medication for non-medical reasons? Never Filed at: 11/18/2024 1119                            History of Present Illness       Chief Complaint   Patient presents with    Abdominal Pain     PT HAS BEEN HAVING EPIGASTRIC ABD PAIN WITH N/V AND LOOSE STOOLS FOR 3 DAYS. PT ALSO STATES DECREASED APPETITE         Past Medical History:   Diagnosis Date    Medical history unknown     Pancreatitis       Past Surgical History:   Procedure Laterality Date    CHOLECYSTECTOMY LAPAROSCOPIC      REPAIR / REINSERT BICEPS TENDON AT ELBOW      Last Assessed: 1/12/2016       Family History   Problem Relation Age of Onset    Diabetes Mother       Social History     Tobacco Use    Smoking status: Never    Smokeless tobacco: Never   Substance Use Topics    Alcohol use: Yes     Alcohol/week: 14.0 standard drinks of alcohol     Types: 14 Standard drinks or equivalent per week     Comment: 2 drinks per night.    Drug use: Yes     Types: Marijuana      E-Cigarette/Vaping      E-Cigarette/Vaping Substances      I have reviewed and agree with  the history as documented.     Patient is a 64-year-old male with pertinent past medical history of alcohol induced pancreatitis who presents for evaluation of abdominal pain.  Patient states that for the past 3 days, he has been experiencing worsening epigastric pain.  He has also had associated nausea, vomiting, and nonbloody loose stools.  He has not been able to tolerate adequate p.o. intake because of the symptoms.  Denies fever, chills, chest pain, shortness of breath, flank pain, dysuria, hematuria.             Review of Systems   All other systems reviewed and are negative.          Objective       ED Triage Vitals [11/18/24 1115]   Temperature Pulse Blood Pressure Respirations SpO2 Patient Position - Orthostatic VS   98.6 °F (37 °C) (!) 112 131/82 16 (!) 63 % Lying      Temp Source Heart Rate Source BP Location FiO2 (%) Pain Score    Oral Monitor Right arm -- 7      Vitals      Date and Time Temp Pulse SpO2 Resp BP Pain Score FACES Pain Rating User   11/20/24 1121 97.3 °F (36.3 °C) 83 99 % 17 121/82 -- -- DII   11/20/24 0900 -- -- -- -- -- No Pain -- TN   11/20/24 0809 98 °F (36.7 °C) 74 97 % 16 105/62 -- -- DII   11/20/24 0602 -- 63 98 % -- 125/72 -- -- DII   11/20/24 0600 -- 63 -- -- 125/72 -- -- SO   11/20/24 0231 98.1 °F (36.7 °C) 69 96 % 16 111/61 -- -- DII   11/19/24 2346 -- -- -- -- 112/70 No Pain -- SO   11/19/24 2235 98.9 °F (37.2 °C) 79 98 % 18 112/70 -- -- DII   11/19/24 2234 -- -- -- -- -- No Pain taking to help with sleep -- SO   11/19/24 1945 -- -- -- -- 112/70 -- -- SO   11/19/24 1459 98.2 °F (36.8 °C) 79 98 % 16 110/67 -- -- DII   11/19/24 0900 -- -- -- -- -- No Pain -- TN   11/19/24 0835 97.8 °F (36.6 °C) 80 96 % 16 106/60 -- -- DII   11/19/24 0559 98 °F (36.7 °C) 75 98 % 19 101/60 -- -- DII   11/19/24 0330 -- 70 -- -- -- -- -- LS   11/18/24 2245 98.7 °F (37.1 °C) 84 96 % 17 131/82 -- -- DII   11/18/24 1936 -- -- -- -- -- No Pain -- LS   11/18/24 1901 -- -- 97 % -- -- -- -- LS   11/18/24  1852 -- -- -- 18 -- No Pain -- SC   11/18/24 1852 -- 87 96 % -- 137/83 -- -- DII   11/18/24 1815 -- 84 98 % 20 121/72 -- -- BG   11/18/24 1644 -- -- -- -- -- No Pain -- BG   11/18/24 1630 -- 86 97 % 18 129/81 No Pain -- BG   11/18/24 1430 -- 92 96 % 15 128/71 -- -- BG   11/18/24 1300 -- 92 94 % 20 129/81 No Pain -- BG   11/18/24 1230 -- 98 96 % 18 131/84 -- -- BG   11/18/24 1115 98.6 °F (37 °C) 112 63 % 16 131/82 7 -- BG            Physical Exam  Constitutional:       Appearance: He is well-developed.   HENT:      Head: Normocephalic and atraumatic.      Mouth/Throat:      Mouth: Mucous membranes are moist.   Cardiovascular:      Rate and Rhythm: Normal rate and regular rhythm.      Heart sounds: Normal heart sounds.   Pulmonary:      Effort: Pulmonary effort is normal.      Breath sounds: Normal breath sounds.   Abdominal:      General: Abdomen is flat.      Palpations: Abdomen is soft.      Tenderness: There is abdominal tenderness in the periumbilical area.   Skin:     General: Skin is warm and dry.      Coloration: Skin is not jaundiced.   Neurological:      General: No focal deficit present.      Mental Status: He is alert.   Psychiatric:         Mood and Affect: Mood normal.         Behavior: Behavior normal.         Results Reviewed       Procedure Component Value Units Date/Time    Lipase [[id number]]  (Abnormal) Collected: 11/19/24 0551    Lab Status: Final result Specimen: Blood from Hand, Left Updated: 11/19/24 0718     Lipase 261 u/L     Comprehensive metabolic panel [[id number]]  (Abnormal) Collected: 11/19/24 0551    Lab Status: Final result Specimen: Blood from Hand, Left Updated: 11/19/24 0718     Sodium 135 mmol/L      Potassium 3.3 mmol/L      Chloride 96 mmol/L      CO2 28 mmol/L      ANION GAP 11 mmol/L      BUN 9 mg/dL      Creatinine 0.77 mg/dL      Glucose 105 mg/dL      Calcium 8.4 mg/dL      AST 19 U/L      ALT 17 U/L      Alkaline Phosphatase 102 U/L      Total Protein 6.0 g/dL      Albumin  3.6 g/dL      Total Bilirubin 0.70 mg/dL      eGFR 94 ml/min/1.73sq m     Narrative:      National Kid[employer] Disease Foundation guidelines for Chronic Kidney Disease (CKD):     Stage 1 with normal or high GFR (GFR > 90 mL/min/1.73 square meters)    Stage 2 Mild CKD (GFR = 60-89 mL/min/1.73 square meters)    Stage 3A Moderate CKD (GFR = 45-59 mL/min/1.73 square meters)    Stage 3B Moderate CKD (GFR = 30-44 mL/min/1.73 square meters)    Stage 4 Severe CKD (GFR = 15-29 mL/min/1.73 square meters)    Stage 5 End Stage CKD (GFR <15 mL/min/1.73 square meters)  Note: GFR calculation is accurate only with a steady state creatinine    Magnesium [[id number]]  (Abnormal) Collected: 11/19/24 0551    Lab Status: Final result Specimen: Blood from Hand, Left Updated: 11/19/24 0718     Magnesium 1.2 mg/dL     CBC (With Platelets) [[id number]]  (Abnormal) Collected: 11/19/24 0551    Lab Status: Final result Specimen: Blood from Arm, Left Updated: 11/19/24 0712     WBC 6.68 Thousand/uL      RBC 4.68 Million/uL      Hemoglobin 14.7 g/dL      Hematocrit 45.1 %      MCV 96 fL      MCH 31.4 pg      MCHC 32.6 g/dL      RDW 15.9 %      Platelets 147 Thousands/uL      MPV 10.5 fL     Urine Microscopic [[id number]]  (Abnormal) Collected: 11/18/24 1316    Lab Status: Final result Specimen: Urine, Clean Catch Updated: 11/18/24 1334     RBC, UA 2-4 /hpf      WBC, UA 2-4 /hpf      Epithelial Cells Occasional /hpf      Bacteria, UA None Seen /hpf     UA w Reflex to Microscopic w Reflex to Culture [[id number]]  (Abnormal) Collected: 11/18/24 1316    Lab Status: Final result Specimen: Urine, Clean Catch Updated: 11/18/24 1332     Color, UA Dark Yellow     Clarity, UA Hazy     Specific Gravity, UA >=1.030     pH, UA 5.5     Leukocytes, UA Moderate     Nitrite, UA Negative     Protein,  (2+) mg/dl      Glucose, UA 30 (3/100%) mg/dl      Ketones, UA Trace mg/dl      Urobilinogen, UA 4.0 mg/dl      Bilirubin, UA Small     Occult Blood, UA Trace     Comprehensive metabolic panel [[id number]]  (Abnormal) Collected: 11/18/24 1144    Lab Status: Final result Specimen: Blood from Arm, Left Updated: 11/18/24 1215     Sodium 135 mmol/L      Potassium 3.3 mmol/L      Chloride 95 mmol/L      CO2 27 mmol/L      ANION GAP 13 mmol/L      BUN 8 mg/dL      Creatinine 1.08 mg/dL      Glucose 174 mg/dL      Calcium 9.4 mg/dL      AST 26 U/L      ALT 23 U/L      Alkaline Phosphatase 140 U/L      Total Protein 7.4 g/dL      Albumin 4.1 g/dL      Total Bilirubin 1.27 mg/dL      eGFR 71 ml/min/1.73sq m     Narrative:      National Kidney Disease Foundation guidelines for Chronic Kidney Disease (CKD):     Stage 1 with normal or high GFR (GFR > 90 mL/min/1.73 square meters)    Stage 2 Mild CKD (GFR = 60-89 mL/min/1.73 square meters)    Stage 3A Moderate CKD (GFR = 45-59 mL/min/1.73 square meters)    Stage 3B Moderate CKD (GFR = 30-44 mL/min/1.73 square meters)    Stage 4 Severe CKD (GFR = 15-29 mL/min/1.73 square meters)    Stage 5 End Stage CKD (GFR <15 mL/min/1.73 square meters)  Note: GFR calculation is accurate only with a steady state creatinine    Lipase [[id number]]  (Abnormal) Collected: 11/18/24 1144    Lab Status: Final result Specimen: Blood from Arm, Left Updated: 11/18/24 1215     Lipase 367 u/L     CBC and differential [[id number]]  (Abnormal) Collected: 11/18/24 1144    Lab Status: Final result Specimen: Blood from Arm, Left Updated: 11/18/24 1154     WBC 7.68 Thousand/uL      RBC 5.39 Million/uL      Hemoglobin 16.8 g/dL      Hematocrit 51.8 %      MCV 96 fL      MCH 31.2 pg      MCHC 32.4 g/dL      RDW 16.0 %      MPV 9.6 fL      Platelets 257 Thousands/uL      nRBC 0 /100 WBCs      Segmented % 77 %      Immature Grans % 1 %      Lymphocytes % 14 %      Monocytes % 7 %      Eosinophils Relative 0 %      Basophils Relative 1 %      Absolute Neutrophils 5.91 Thousands/µL      Absolute Immature Grans 0.04 Thousand/uL      Absolute Lymphocytes 1.10 Thousands/µL       Absolute Monocytes 0.55 Thousand/µL      Eosinophils Absolute 0.01 Thousand/µL      Basophils Absolute 0.07 Thousands/µL             CT abdomen pelvis with contrast   ED Interpretation by Agustín Rose MD (11/18 [id number])   Pancreatitis versus duodenitis.  Similar to CT scan from August.      Final Interpretation by Beth Matute MD (11/18 3542)      Findings compatible with acute interstitial edematous pancreatitis with stranding of the fat surrounding the pancreatic head and uncinate process. No free fluid or fluid collection.. A small 1.3 cm cystic lesion/focal necrosis in the head of the pancreas    is unchanged. Pancreatic ductal dilatation has also been present since at least 2019.      Intra and extrahepatic biliary ductal dilatation, likely due to a stricture in the head of the pancreas also unchanged.      The study was marked in EPIC for immediate notification.         Workstation performed: [id number]             ECG 12 Lead Documentation Only    Date/Time: 11/20/2024 11:30 AM    Performed by: Delphine Henderson MD  Authorized by: Delphine Henderson MD    Indications / Diagnosis:  Tachycardia  Patient location:  ED  Interpretation:     Interpretation: abnormal    Rate:     ECG rate:  111    ECG rate assessment: tachycardic    Ectopy:     Ectopy: PAC    QRS:     QRS axis:  Right  ST segments:     ST segments:  Abnormal  T waves:     T waves: inverted        ED Medication and Procedure Management   Prior to Admission Medications   Prescriptions Last Dose Informant Patient Reported? Taking?   FLUoxetine (PROzac) 40 MG capsule   No No   Sig: TAKE ONE CAPSULE BY MOUTH EVERY DAY   Multiple Vitamin (DAILY VALUE MULTIVITAMIN) TABS   Yes No   Sig: Take 1 tablet by mouth daily   albuterol (PROVENTIL HFA,VENTOLIN HFA) 90 mcg/act inhaler   Yes No   Sig: Inhale 1-2 puffs   allopurinol (ZYLOPRIM) 300 mg tablet   No No   Sig: TAKE 1 TABLET EVERY DAY   cetirizine (ZyrTEC) 10 mg tablet   Yes No   Sig: Take one  tablet by mouth daily as needed for allergies/congestion    folic acid (FOLVITE) 1 mg tablet   No No   Sig: Take 1 tablet (1 mg total) by mouth daily   levothyroxine 75 mcg tablet   No No   Sig: TAKE 1 TABLET IN THE MORNING ON AN EMPTY STOMACH FOR THYROID   pantoprazole (PROTONIX) 40 mg tablet   No No   Sig: Take 1 tablet (40 mg total) by mouth daily in the early morning   thiamine 100 MG tablet   No No   Sig: Take 1 tablet (100 mg total) by mouth daily      Facility-Administered Medications: None     Current Discharge Medication List        CONTINUE these medications which have NOT CHANGED    Details   albuterol (PROVENTIL HFA,VENTOLIN HFA) 90 mcg/act inhaler Inhale 1-2 puffs      allopurinol (ZYLOPRIM) 300 mg tablet TAKE 1 TABLET EVERY DAY  Qty: 90 tablet, Refills: 3    Associated Diagnoses: Gout, unspecified cause, unspecified chronicity, unspecified site      cetirizine (ZyrTEC) 10 mg tablet Take one tablet by mouth daily as needed for allergies/congestion       FLUoxetine (PROzac) 40 MG capsule TAKE ONE CAPSULE BY MOUTH EVERY DAY  Qty: 90 capsule, Refills: 1    Associated Diagnoses: Other depression      folic acid (FOLVITE) 1 mg tablet Take 1 tablet (1 mg total) by mouth daily  Qty: 30 tablet, Refills: 0    Associated Diagnoses: Alcohol use disorder, severe, dependence (HCC)      levothyroxine 75 mcg tablet TAKE 1 TABLET IN THE MORNING ON AN EMPTY STOMACH FOR THYROID  Qty: 90 tablet, Refills: 1    Associated Diagnoses: Hypothyroidism, unspecified type      Multiple Vitamin (DAILY VALUE MULTIVITAMIN) TABS Take 1 tablet by mouth daily      pantoprazole (PROTONIX) 40 mg tablet Take 1 tablet (40 mg total) by mouth daily in the early morning  Qty: 30 tablet, Refills: 0    Associated Diagnoses: Alcohol use disorder, severe, dependence (HCC)      thiamine 100 MG tablet Take 1 tablet (100 mg total) by mouth daily  Qty: 30 tablet, Refills: 0    Associated Diagnoses: Alcohol use disorder, severe, dependence (HCC)            No discharge procedures on file.  ED SEPSIS DOCUMENTATION   Time reflects when diagnosis was documented in both MDM as applicable and the Disposition within this note       Time User Action Codes Description Comment    11/18/2024  4:46 PM Delphine Henderson [K85.90] Pancreatitis     11/20/2024  8:21 AM Rosana Zuluaga [F10.939] Alcohol withdrawal syndrome with complication (HCC)     11/20/2024  8:21 AM Rosana Zuluaga [F10.10] Alcohol abuse                  Delphine Henderson MD  11/20/24 1138

## 2024-12-17 NOTE — ED PROVIDER NOTE - OBJECTIVE STATEMENT
58 yo F with history of colon CA with mets to liver and lungs presenting with feelings of unwellness, dizziness (room spining) difficulty breathing and unsteadiness. Denies fevers, chills, cough, rhinorrhea, otorrhea, otalgia, nausea, vomiting, constipation, diarrhea, chest pain, shortness of breath or changes in urinary habits. Pt currently on chemo - last chemo thursday. Symptoms since thursday
denies